# Patient Record
Sex: FEMALE | Race: WHITE | NOT HISPANIC OR LATINO | Employment: OTHER | URBAN - NONMETROPOLITAN AREA
[De-identification: names, ages, dates, MRNs, and addresses within clinical notes are randomized per-mention and may not be internally consistent; named-entity substitution may affect disease eponyms.]

---

## 2017-10-09 ENCOUNTER — AMBULATORY - GICH (OUTPATIENT)
Dept: FAMILY MEDICINE | Facility: OTHER | Age: 72
End: 2017-10-09

## 2017-10-09 DIAGNOSIS — Z23 ENCOUNTER FOR IMMUNIZATION: ICD-10-CM

## 2017-11-22 ENCOUNTER — OFFICE VISIT - GICH (OUTPATIENT)
Dept: INTERNAL MEDICINE | Facility: OTHER | Age: 72
End: 2017-11-22

## 2017-11-22 ENCOUNTER — HISTORY (OUTPATIENT)
Dept: INTERNAL MEDICINE | Facility: OTHER | Age: 72
End: 2017-11-22

## 2017-11-22 DIAGNOSIS — R03.0 ELEVATED BLOOD PRESSURE READING WITHOUT DIAGNOSIS OF HYPERTENSION: ICD-10-CM

## 2017-11-22 DIAGNOSIS — Z79.899 OTHER LONG TERM (CURRENT) DRUG THERAPY: ICD-10-CM

## 2017-11-22 DIAGNOSIS — E78.00 PURE HYPERCHOLESTEROLEMIA: ICD-10-CM

## 2017-11-22 DIAGNOSIS — R73.03 PREDIABETES: ICD-10-CM

## 2017-11-22 LAB
A/G RATIO - HISTORICAL: 2 (ref 1–2)
ALBUMIN SERPL-MCNC: 4.7 G/DL (ref 3.5–5.7)
ALP SERPL-CCNC: 73 IU/L (ref 34–104)
ALT (SGPT) - HISTORICAL: 14 IU/L (ref 7–52)
ANION GAP - HISTORICAL: 12 (ref 5–18)
AST SERPL-CCNC: 22 IU/L (ref 13–39)
BILIRUB SERPL-MCNC: 0.4 MG/DL (ref 0.3–1)
BUN SERPL-MCNC: 17 MG/DL (ref 7–25)
BUN/CREAT RATIO - HISTORICAL: 23
CALCIUM SERPL-MCNC: 9.6 MG/DL (ref 8.6–10.3)
CHLORIDE SERPLBLD-SCNC: 105 MMOL/L (ref 98–107)
CHOL/HDL RATIO - HISTORICAL: 3.19
CHOLESTEROL TOTAL: 172 MG/DL
CO2 SERPL-SCNC: 24 MMOL/L (ref 21–31)
CREAT SERPL-MCNC: 0.75 MG/DL (ref 0.7–1.3)
ERYTHROCYTE [DISTWIDTH] IN BLOOD BY AUTOMATED COUNT: 13.4 % (ref 11.5–15.5)
ESTIMATED AVERAGE GLUCOSE: 97 MG/DL
GFR IF NOT AFRICAN AMERICAN - HISTORICAL: >60 ML/MIN/1.73M2
GLOBULIN - HISTORICAL: 2.4 G/DL (ref 2–3.7)
GLUCOSE SERPL-MCNC: 105 MG/DL (ref 70–105)
HCT VFR BLD AUTO: 43.2 % (ref 33–51)
HDLC SERPL-MCNC: 54 MG/DL (ref 23–92)
HEMOGLOBIN A1C MONITORING (POCT) - HISTORICAL: 5 % (ref 4–6.2)
HEMOGLOBIN: 14.5 G/DL (ref 12–16)
LDLC SERPL CALC-MCNC: 95 MG/DL
MCH RBC QN AUTO: 30.2 PG (ref 26–34)
MCHC RBC AUTO-ENTMCNC: 33.6 G/DL (ref 32–36)
MCV RBC AUTO: 90 FL (ref 80–100)
NON-HDL CHOLESTEROL - HISTORICAL: 118 MG/DL
PLATELET # BLD AUTO: 226 THOU/CU MM (ref 140–440)
PMV BLD: 10.5 FL (ref 6.5–11)
POTASSIUM SERPL-SCNC: 4.3 MMOL/L (ref 3.5–5.1)
PROT SERPL-MCNC: 7.1 G/DL (ref 6.4–8.9)
PROVIDER ORDERDED STATUS - HISTORICAL: NORMAL
RED BLOOD COUNT - HISTORICAL: 4.8 MIL/CU MM (ref 4–5.2)
SODIUM SERPL-SCNC: 141 MMOL/L (ref 133–143)
TRIGL SERPL-MCNC: 116 MG/DL
WHITE BLOOD COUNT - HISTORICAL: 5.9 THOU/CU MM (ref 4.5–11)

## 2017-11-22 ASSESSMENT — PATIENT HEALTH QUESTIONNAIRE - PHQ9: SUM OF ALL RESPONSES TO PHQ QUESTIONS 1-9: 0

## 2017-11-29 ENCOUNTER — HOSPITAL ENCOUNTER (OUTPATIENT)
Dept: RADIOLOGY | Facility: OTHER | Age: 72
End: 2017-11-29
Attending: INTERNAL MEDICINE

## 2017-11-29 DIAGNOSIS — Z12.31 ENCOUNTER FOR SCREENING MAMMOGRAM FOR MALIGNANT NEOPLASM OF BREAST: ICD-10-CM

## 2017-12-27 NOTE — PROGRESS NOTES
Patient Information     Patient Name MRN Diane Ball [id number] Female 1945      Progress Notes by Savita Tariq DO at 2017  9:40 AM     Author:  Savita Tariq DO Service:  (none) Author Type:  PHYS- Osteopathic     Filed:  2017  7:08 AM Encounter Date:  2017 Status:  Signed     :  Savita Tariq DO (PHYS- Osteopathic)            Chief Complaint     Patient presents with       Medication Management      review, discussion and refills       HPI: Ms. Ricardo is a 72 y.o. female who presents today to [facility] and for annual visit.  She overall is feeling good.      She overall is doing well.  She does have a history of hyperlipidemia and prediabetes.  She has been watching her sugar intake overall.  The last couple years her sugars have been normal.  She does have a history of hyperlipidemia and is on lovastatin.  She denies any side effects from the medication.  She does need a refill today.  She is on a baby aspirin which she takes daily.  She denies any GI upset from this.  A history of elevated blood pressure while in the clinic.  She does take it at home and it is typically in the 1:30 range systolic.    She is post menopausal.  She is up-to-date on her DEXA scan which was normal in .  She does have a mammogram scheduled in the next week.  She has never had colonoscopy and it has been a while since having any colon cancer screening.  She did have her vaccines done and she is up-to-date.    History is discussed on 2017 with patient and reviewed in previous available records by myself.  It is current to the best of my knowledge as below.    Past Medical History:     Diagnosis  Date     Elevated blood pressure reading without diagnosis of hypertension     Elevated in clinic, monitors at home with normal values.        Hyperlipidemia 2011     Prediabetes 2015        Past Surgical History:      Procedure  Laterality Date     LAP CHOLECYSTECTOMY       Laparoscopic cholecystectomy        TONSIL AND ADENOIDECTOMY      as a child           Current Outpatient Prescriptions     Medication  Sig     aspirin 81 mg tablet Take 81 mg by mouth once daily with a meal.     CALCIUM CARBONATE/VITAMIN D2 (CALCIUM 600 + D ORAL) Take  by mouth 2 times daily.     lovastatin (MEVACOR) 20 mg tablet Take 1 tablet by mouth at bedtime.     MULTIVITS-MIN/FA/LUT/ZEAXANTH (ICAPS MV ORAL) Take  by mouth. 4 caps daily      No current facility-administered medications for this visit.      Medications have been reviewed by me and are current to the best of my knowledge and ability.       No Known Allergies     Family History       Problem   Relation Age of Onset     Cancer  Mother      Spinal Cancer       Cancer  Maternal Aunt      Hodgkin's lymphoma       Cancer-prostate  Other      Cancer  Other      thyroid cancer       Unknown  Father      Heart attack  Maternal Grandmother 74     Asthma  Maternal Grandfather      No Known Problems  Son      Cancer-breast  No Family History        Family Status     Relation  Status     Mother  at age 79    spinal cancer      Maternal Aunt      Other      Father      Maternal Grandmother      Maternal Grandfather      Son Alive     No Family History          Social History     Social History        Marital status:       Spouse name: N/A     Number of children:  N/A     Years of education:  N/A     Social History Main Topics          Smoking status:   Former Smoker      Packs/day:  0.40      Years:  22.00      Types:  Cigarettes      Quit date:  1986      Smokeless tobacco:   Never Used      Alcohol use   0.5 oz/week     1 Glasses of wine per week        Comment: occasionally       Drug use:   No      Sexual activity:   Yes      Partners:  Male      Birth control/ protection:  Post-menopausal      Other Topics  Concern     Not on file      Social History Narrative     The patient is , Jonny.  She is a  "retired .    One grown son, lives in [place], one grandchild.     and son are POA for health care if needed.                                  ROS  GEN: -fevers/-chills/-night sweats/-wt change  NEURO: -headaches/-vision changes  EARS: -hearing changes/-tinnitus  NOSE: -drainage/-congestion  MOUTH/THROAT: - sore throat/-dysphagia/-sores  LUNGS: -sob/-cough  CARDIOVASCULAR: -cp/-palpitations  GI: -pain/-diarrhea/-constipation/-bloody stools  : -dysuria/-hematuria  ENDOCRINE: -skin or hair changes/-hot-cold intolerance  HEMATOLOGIC/LYMPHATIC: -swollen nodes/-easy bruising  SKIN: -rashes/-lesions  MSK/RHEUM: + Occasional joint pain/-swelling  NEURO: -weakness/-parasthesias  PSYCH: -anhedonia/-depression/-anxiety  SLEEP: -daytime somnolence       EXAM:   /74  Pulse 72  Temp 97.7  F (36.5  C) (Tympanic)  Resp 20  Ht 1.537 m (5' 0.5\")  Wt 94.4 kg (208 lb 3 oz)  Breastfeeding? No  BMI 39.99 kg/m2  Estimated body mass index is 39.99 kg/(m^2) as calculated from the following:    Height as of this encounter: 1.537 m (5' 0.5\").    Weight as of this encounter: 94.4 kg (208 lb 3 oz).   GEN: Vitals reviewed.  Healthy appearing. Patient is in no acute distress. Cooperative with exam.  HEENT: Normocephalic atraumatic.  Normal external eye, conjunctiva, lids, cornea with no scleral icterus or conjunctival erythema. Pupils equally round. Oropharynx with no erythema or exudates. Dentition adequate.    NECK: Supple; no thyromegaly or masses noted.  No cervical or supraclavicular lymphadenopathy.  CV: Heart regular in rate and rhythm with no murmur.  Radial and posterior tibial pulses palpable.  LUNGS: Lungs clear to auscultation bilaterally.  Chest rise equal bilaterally.  No accessory muscle use.  ABD:  Obese, Soft, nondistended.  SKIN: Warm and dry to touch.  No rash on face, arms and legs.  EXT: No clubbing or cyanosis.  No peripheral edema.  NEURO: Alert and oriented to person, place, and " time.  Cranial nerves II-XII grossly intact with no focal or lateralizing deficits.  Muscle tone normal.  Gait normal. No tremor. Sensation intact to light touch.  MSK: ROM of upper and lower ext symmetric and full.  PSYCH: Mood is good. Affect appropriate. Speech fluent. Answers questions appropriately and thought process normal.       ASSESSMENT AND PLAN:    1. Pure hypercholesterolemia  - lipids checked and pending  - On statin with no clinical evidence or complaint of myalgias or other ADRs  - Discussed importance of exercise and diet.  - Ms. Ricardo's Body mass index is 39.99 kg/(m^2). This is out of the normal range for a 72 y.o. Normal range for ages 18+ is between 18.5 and 24.9. To lose weight we reviewed risks and benefits of appropriate options such as diet, exercise, and medications. Patient's strategy will be  self-directed nutrition plan and self-directed exercise program  - lovastatin (MEVACOR) 20 mg tablet; Take 1 tablet by mouth at bedtime.  Dispense: 90 tablet; Refill: 4  - COMP METABOLIC PANEL; Future    2. Prediabetes  - at this time we will recheck her A1c.  If it is normal again we will plan to move digits checking her blood sugar yearly.  She is to continue to work on her diet overall.  She is also encouraged to work on weight loss.  - HEMOGLOBIN A1C MONITORING (POCT); Future    3. High risk medication use  - COMP METABOLIC PANEL; Future  - CBC W PLT NO DIFF; Future    4. Elevated blood pressure reading without diagnosis of hypertension  - at this time we will continue to monitor her blood pressure.  She was given instructions for home.  She is to call if this does remain elevated.        Return in about 1 year (around 11/22/2018) for Annual Exam.       Patient Instructions   Please check your blood pressure daily at various times, record this and bring it to your follow up appointment.  Your blood pressure goal is greater than 110/50 and less than 130/80.  Please call if it is consistently  "outside this range.       Index German All languages Related topics   High Blood Pressure: Essential Hypertension   ________________________________________________________________________  KEY POINTS    High blood pressure means that your blood pressure is higher than normal. It is called essential hypertension when no cause for it can be found.    Weight loss, changes in your diet, and exercise may be the only treatment you need. If lifestyle changes don t lower your blood pressure enough, your healthcare provider may prescribe medicine. Many people need to take 2 or more medicines to bring their blood pressure down to a healthy level.    Talk to your healthcare provider about your personal and family medical history and your lifestyle habits. This will help you know what you can do to lower your risk for high blood pressure.  ________________________________________________________________________  What is high blood pressure?  Blood pressure is the force of blood against artery walls as the heart pumps blood through the body. You may be told that you have high blood pressure (hypertension) if your blood pressure is higher than normal. Hypertension is called essential when no cause for it can be found. When the cause of hypertension is known, such as from kidney disease or a tumor, it is called secondary hypertension.  Blood pressure can rise and fall with exercise, rest, or emotions.    Normal resting blood pressure ranges up to 120/80 (\"120 over 80\"). The first number (120 in this example) is the pressure when the heart beats and pushes blood out to the rest of the body. The second number (80 in this example) is the pressure when the heart rests between beats.    Blood pressure is borderline high if it is 120/80 or higher but less than 140/90.    High blood pressure is 140/90 or higher for most people. If you have chronic kidney disease, 130/80 or higher is considered high blood pressure.    Why is high blood " pressure a problem?  High blood pressure is a problem in many ways.    Your heart has to work harder to pump blood through your body. The added workload on the heart causes thickening of the heart muscle. Over time, the thickening damages the heart muscle so that it can no longer pump normally. This can lead to a disease called heart failure.    The higher pressure in your arteries may cause them to weaken and bleed, resulting in a stroke.    As you get older, blood vessels may become hardened. High blood pressure speeds up this process. Hardened or narrowed arteries may not be able to supply enough blood to all parts of your body.    High blood pressure may lead to atherosclerosis, which is when deposits of cholesterol, fatty substances, and blood cells clog up an artery. Atherosclerosis is the leading cause of heart attacks. It can also cause strokes.    Your kidneys, brain, and eyes may be damaged.  You may need treatment for high blood pressure for the rest of your life. However, proper treatment can control your blood pressure and help prevent heart disease, heart attack, or stroke. It can also help prevent long-term health problems, such as heart failure, kidney failure, blindness, and dementia.  If you already have some complications, such as breathing problems or chest pain, lowering your blood pressure may make these problems less severe.    What is the cause?  There are no clear causes of essential hypertension. However, many things can increase blood pressure, such as:    Being overweight    Smoking    Eating a diet high in salt    Drinking a lot of alcohol  Other important factors include:    Race.  Americans are more likely to have high blood pressure.    Gender. Males have a greater chance of developing high blood pressure than women until age 55. After the age of 75, women are more likely to develop high blood pressure than men.    Heredity. If you have parents with high blood pressure, you are  more at risk.    Age. The older you get, the more likely you are to have high blood pressure.  Also, some medicines increase blood pressure.  Stress and drinking caffeine can make blood pressure go up temporarily, but it s not clear that they have any long-term effects on blood pressure.    What are the symptoms?  You may have high blood pressure for a long time without symptoms. You may not be able to tell by the way you feel that your blood pressure is high. The only way to find out if your blood pressure is high is to have it measured. That's why it s important to have your blood pressure checked at least once a year.  When high blood pressure does cause symptoms, they may include:    Headaches    Nosebleeds    Getting tired easily    Blurred vision    Dizziness    Lightheadedness    Feeling like your heart is racing or fluttering    Shortness of breath    Chest pain    Memory problems    Daytime sleepiness    How is it diagnosed?  Blood pressure is checked at most healthcare visits. High blood pressure is usually discovered during one of these visits. If your blood pressure is high, you will be asked to return for follow-up checks. Your healthcare provider will ask about your personal and family medical history and examine you.  Tests to look for a possible cause of high blood pressure may include:    Urine and blood tests    Chest X-ray    Electrocardiogram (ECG), which measures and records your heartbeat  You may be asked to use a portable blood-pressure measuring device, which will take your pressure at different times during day and night.    How is it treated?  If your blood pressure is borderline high, you may be able to bring it down to a normal level without medicine. Weight loss, changes in your diet, and exercise may be the only treatment you need.  If lifestyle changes don t lower your blood pressure enough, your healthcare provider may prescribe medicine. Many people need to take 2 or more medicines  to bring their blood pressure down to a healthy level. It may take several weeks or months to find the best treatment for you.    How can I take care of myself?  If you have high blood pressure, there are things you can do now to take care of yourself and to prevent problems in the future:    Follow your treatment plan and know how to take your medicines.    Work with your healthcare provider to find what lifestyle changes and medicines are right for you.    Follow the directions that come with your medicine, including information about food or alcohol. Make sure you know how and when to take your medicine. Do not take more or less than you are supposed to take.    Many medicines have side effects. A side effect is a symptom or problem that is caused by the medicine. Ask your healthcare provider or pharmacist what side effects your medicine may cause and what you should do if you have side effects. Ask if you should avoid some nonprescription medicines.    Be careful with nonprescription medicines or herbal supplements. Some can raise blood pressure. This includes diet pills, cold and pain medicines, and energy boosters. Read labels or ask your pharmacist if the medicine or supplement affects blood pressure. Some illegal drugs, like cocaine, can also affect blood pressure.    Check your blood pressure (or have it checked) as often as your provider advises. Keep a diary of the readings. A diary is also a good place to note your exercise, weight, salt intake, types of food you are eating, and your feelings. This can help you learn how these things can affect your blood pressure. Take your diary with you when you visit your provider. It may help you and your provider manage your blood pressure and adjust your medicines if needed.    Don t smoke.    Eat a healthy diet that is low in salt, saturated fat, trans fat, and cholesterol. Include lots of fruits, vegetables, and fat-free or low-fat milk and milk products.    Get  regular exercise, according to your healthcare provider's advice. For example, you might walk, bike, or swim at least 30 minutes 3 to 5 times a week.    Limit the amount of alcohol you drink. Moderate drinking is up to 1 drink a day for women and up to 2 drinks for men.    Lose weight if you need to.    Try to reduce the stress in your life or learn how to deal better with situations that make you feel anxious.    Ask your healthcare provider:    How and when you will get your test results    How long it will take to recover    If there are activities you should avoid and when you can return to your normal activities    How to take care of yourself at home    What symptoms or problems you should watch for and what to do if you have them    Make sure you know when you should come back for a checkup. Keep all appointments for provider visits or tests.    How can I help prevent high blood pressure?  You can help prevent this disease with a heart-healthy lifestyle:    Eat a healthy diet and keep a healthy weight.    Stay fit with the right kind of exercise for you.    Decrease stress.    Don t smoke.    Limit your use of alcohol.  Talk to your healthcare provider about your personal and family medical history and your lifestyle habits. This will help you know what you can do to lower your risk for high blood pressure.    Developed by [employer].  Adult Advisor 2016.3 published by [employer].  Last modified: 2016-04-18  Last reviewed: 2016-04-15  This content is reviewed periodically and is subject to change as new health information becomes available. The information is intended to inform and educate and is not a replacement for medical evaluation, advice, diagnosis or treatment by a healthcare professional.  References   Adult Advisor 2016.3 Index    Copyright   2016 [employer], a division of [employer] All rights reserved.               MOLLY OTERO DO   11/22/2017 10:30 AM    This document was  prepared using voice generated softwear. While every attempt was made for accuracy, grammatical errors may exist.

## 2017-12-28 NOTE — PATIENT INSTRUCTIONS
Patient Information     Patient Name MRN Diane Ball [id number] Female 1945      Patient Instructions by Savita Tariq DO at 2017  9:40 AM     Author:  Savita Tariq DO Service:  (none) Author Type:  PHYS- Osteopathic     Filed:  2017 10:26 AM Encounter Date:  2017 Status:  Signed     :  Savita Tariq DO (PHYS- Osteopathic)            Please check your blood pressure daily at various times, record this and bring it to your follow up appointment.  Your blood pressure goal is greater than 110/50 and less than 130/80.  Please call if it is consistently outside this range.       Index Setswana All languages Related topics   High Blood Pressure: Essential Hypertension   ________________________________________________________________________  KEY POINTS    High blood pressure means that your blood pressure is higher than normal. It is called essential hypertension when no cause for it can be found.    Weight loss, changes in your diet, and exercise may be the only treatment you need. If lifestyle changes don t lower your blood pressure enough, your healthcare provider may prescribe medicine. Many people need to take 2 or more medicines to bring their blood pressure down to a healthy level.    Talk to your healthcare provider about your personal and family medical history and your lifestyle habits. This will help you know what you can do to lower your risk for high blood pressure.  ________________________________________________________________________  What is high blood pressure?  Blood pressure is the force of blood against artery walls as the heart pumps blood through the body. You may be told that you have high blood pressure (hypertension) if your blood pressure is higher than normal. Hypertension is called essential when no cause for it can be found. When the cause of hypertension is known, such as from kidney disease or a tumor, it is called secondary  "hypertension.  Blood pressure can rise and fall with exercise, rest, or emotions.    Normal resting blood pressure ranges up to 120/80 (\"120 over 80\"). The first number (120 in this example) is the pressure when the heart beats and pushes blood out to the rest of the body. The second number (80 in this example) is the pressure when the heart rests between beats.    Blood pressure is borderline high if it is 120/80 or higher but less than 140/90.    High blood pressure is 140/90 or higher for most people. If you have chronic kidney disease, 130/80 or higher is considered high blood pressure.    Why is high blood pressure a problem?  High blood pressure is a problem in many ways.    Your heart has to work harder to pump blood through your body. The added workload on the heart causes thickening of the heart muscle. Over time, the thickening damages the heart muscle so that it can no longer pump normally. This can lead to a disease called heart failure.    The higher pressure in your arteries may cause them to weaken and bleed, resulting in a stroke.    As you get older, blood vessels may become hardened. High blood pressure speeds up this process. Hardened or narrowed arteries may not be able to supply enough blood to all parts of your body.    High blood pressure may lead to atherosclerosis, which is when deposits of cholesterol, fatty substances, and blood cells clog up an artery. Atherosclerosis is the leading cause of heart attacks. It can also cause strokes.    Your kidneys, brain, and eyes may be damaged.  You may need treatment for high blood pressure for the rest of your life. However, proper treatment can control your blood pressure and help prevent heart disease, heart attack, or stroke. It can also help prevent long-term health problems, such as heart failure, kidney failure, blindness, and dementia.  If you already have some complications, such as breathing problems or chest pain, lowering your blood " pressure may make these problems less severe.    What is the cause?  There are no clear causes of essential hypertension. However, many things can increase blood pressure, such as:    Being overweight    Smoking    Eating a diet high in salt    Drinking a lot of alcohol  Other important factors include:    Race.  Americans are more likely to have high blood pressure.    Gender. Males have a greater chance of developing high blood pressure than women until age 55. After the age of 75, women are more likely to develop high blood pressure than men.    Heredity. If you have parents with high blood pressure, you are more at risk.    Age. The older you get, the more likely you are to have high blood pressure.  Also, some medicines increase blood pressure.  Stress and drinking caffeine can make blood pressure go up temporarily, but it s not clear that they have any long-term effects on blood pressure.    What are the symptoms?  You may have high blood pressure for a long time without symptoms. You may not be able to tell by the way you feel that your blood pressure is high. The only way to find out if your blood pressure is high is to have it measured. That's why it s important to have your blood pressure checked at least once a year.  When high blood pressure does cause symptoms, they may include:    Headaches    Nosebleeds    Getting tired easily    Blurred vision    Dizziness    Lightheadedness    Feeling like your heart is racing or fluttering    Shortness of breath    Chest pain    Memory problems    Daytime sleepiness    How is it diagnosed?  Blood pressure is checked at most healthcare visits. High blood pressure is usually discovered during one of these visits. If your blood pressure is high, you will be asked to return for follow-up checks. Your healthcare provider will ask about your personal and family medical history and examine you.  Tests to look for a possible cause of high blood pressure may  include:    Urine and blood tests    Chest X-ray    Electrocardiogram (ECG), which measures and records your heartbeat  You may be asked to use a portable blood-pressure measuring device, which will take your pressure at different times during day and night.    How is it treated?  If your blood pressure is borderline high, you may be able to bring it down to a normal level without medicine. Weight loss, changes in your diet, and exercise may be the only treatment you need.  If lifestyle changes don t lower your blood pressure enough, your healthcare provider may prescribe medicine. Many people need to take 2 or more medicines to bring their blood pressure down to a healthy level. It may take several weeks or months to find the best treatment for you.    How can I take care of myself?  If you have high blood pressure, there are things you can do now to take care of yourself and to prevent problems in the future:    Follow your treatment plan and know how to take your medicines.    Work with your healthcare provider to find what lifestyle changes and medicines are right for you.    Follow the directions that come with your medicine, including information about food or alcohol. Make sure you know how and when to take your medicine. Do not take more or less than you are supposed to take.    Many medicines have side effects. A side effect is a symptom or problem that is caused by the medicine. Ask your healthcare provider or pharmacist what side effects your medicine may cause and what you should do if you have side effects. Ask if you should avoid some nonprescription medicines.    Be careful with nonprescription medicines or herbal supplements. Some can raise blood pressure. This includes diet pills, cold and pain medicines, and energy boosters. Read labels or ask your pharmacist if the medicine or supplement affects blood pressure. Some illegal drugs, like cocaine, can also affect blood pressure.    Check your blood  pressure (or have it checked) as often as your provider advises. Keep a diary of the readings. A diary is also a good place to note your exercise, weight, salt intake, types of food you are eating, and your feelings. This can help you learn how these things can affect your blood pressure. Take your diary with you when you visit your provider. It may help you and your provider manage your blood pressure and adjust your medicines if needed.    Don t smoke.    Eat a healthy diet that is low in salt, saturated fat, trans fat, and cholesterol. Include lots of fruits, vegetables, and fat-free or low-fat milk and milk products.    Get regular exercise, according to your healthcare provider's advice. For example, you might walk, bike, or swim at least 30 minutes 3 to 5 times a week.    Limit the amount of alcohol you drink. Moderate drinking is up to 1 drink a day for women and up to 2 drinks for men.    Lose weight if you need to.    Try to reduce the stress in your life or learn how to deal better with situations that make you feel anxious.    Ask your healthcare provider:    How and when you will get your test results    How long it will take to recover    If there are activities you should avoid and when you can return to your normal activities    How to take care of yourself at home    What symptoms or problems you should watch for and what to do if you have them    Make sure you know when you should come back for a checkup. Keep all appointments for provider visits or tests.    How can I help prevent high blood pressure?  You can help prevent this disease with a heart-healthy lifestyle:    Eat a healthy diet and keep a healthy weight.    Stay fit with the right kind of exercise for you.    Decrease stress.    Don t smoke.    Limit your use of alcohol.  Talk to your healthcare provider about your personal and family medical history and your lifestyle habits. This will help you know what you can do to lower your risk  for high blood pressure.    Developed by [employer].  Adult Advisor 2016.3 published by [employer].  Last modified: 2016-04-18  Last reviewed: 2016-04-15  This content is reviewed periodically and is subject to change as new health information becomes available. The information is intended to inform and educate and is not a replacement for medical evaluation, advice, diagnosis or treatment by a healthcare professional.  References   Adult Advisor 2016.3 Index    Copyright   2016 [employer], a division of [employer] All rights reserved.

## 2017-12-30 NOTE — NURSING NOTE
Patient Information     Patient Name MRN Diane Ball [id number] Female 1945      Nursing Note by Ana Waldrop at 2017  9:40 AM     Author:  Ana Waldrop Service:  (none) Author Type:  (none)     Filed:  2017  7:08 AM Encounter Date:  2017 Status:  Signed     :  Ana Waldrop            Patient presents to clinic for medication management, review and refills.    Ana Waldrop LPN..................2017  9:53 AM

## 2018-01-27 VITALS
TEMPERATURE: 97.7 F | BODY MASS INDEX: 39.3 KG/M2 | DIASTOLIC BLOOD PRESSURE: 76 MMHG | SYSTOLIC BLOOD PRESSURE: 170 MMHG | HEART RATE: 72 BPM | HEIGHT: 61 IN | WEIGHT: 208.19 LBS | RESPIRATION RATE: 20 BRPM

## 2018-02-02 ASSESSMENT — PATIENT HEALTH QUESTIONNAIRE - PHQ9: SUM OF ALL RESPONSES TO PHQ QUESTIONS 1-9: 0

## 2018-02-26 ENCOUNTER — DOCUMENTATION ONLY (OUTPATIENT)
Dept: FAMILY MEDICINE | Facility: OTHER | Age: 73
End: 2018-02-26

## 2018-02-26 RX ORDER — LOVASTATIN 20 MG
20 TABLET ORAL AT BEDTIME
COMMUNITY
Start: 2017-11-22 | End: 2018-11-21

## 2018-10-05 ENCOUNTER — ALLIED HEALTH/NURSE VISIT (OUTPATIENT)
Dept: FAMILY MEDICINE | Facility: OTHER | Age: 73
End: 2018-10-05
Attending: INTERNAL MEDICINE
Payer: COMMERCIAL

## 2018-10-05 DIAGNOSIS — Z23 NEED FOR PROPHYLACTIC VACCINATION AND INOCULATION AGAINST INFLUENZA: Primary | ICD-10-CM

## 2018-10-05 PROCEDURE — G0008 ADMIN INFLUENZA VIRUS VAC: HCPCS

## 2018-10-05 PROCEDURE — 90662 IIV NO PRSV INCREASED AG IM: CPT

## 2018-10-05 NOTE — PROGRESS NOTES
Injectable Influenza Immunization Documentation    1.  Is the person to be vaccinated sick today?   No    2. Does the person to be vaccinated have an allergy to a component   of the vaccine?   No  Egg Allergy Algorithm Link    3. Has the person to be vaccinated ever had a serious reaction   to influenza vaccine in the past?   No    4. Has the person to be vaccinated ever had Guillain-Barré syndrome?   No    Form completed by Cari Woodruff LPN 10/5/2018   10:11 AM    Prior to injection verified patient identity using patient's name and date of birth.  Due to injection administration, patient instructed to remain in clinic for 15 minutes  afterwards, and to report any adverse reaction to me immediately.

## 2018-10-05 NOTE — MR AVS SNAPSHOT
After Visit Summary   10/5/2018    Diane Ricardo    MRN: [id number]           Patient Information     Date Of Birth          1945        Visit Information        Provider Department      10/5/2018 9:45 AM  [facility]        Today's Diagnoses     Need for prophylactic vaccination and inoculation against influenza    -  1       Follow-ups after your visit        Your next 10 appointments already scheduled     Nov 21, 2018 10:00 AM CST   PHYSICAL with Saima Engle CNP   [facility] ([facility]    [address]  [place] [address]   [phone]            Nov 30, 2018  9:15 AM CST   (Arrive by 9:00 AM)   MA SCREENING BILATERAL W/ LEEANN with [facility]    [address]  [place] [address]   [phone]           How do I prepare for my exam? (Food and drink instructions) No Food and Drink Restrictions.  How do I prepare for my exam? (Other instructions) Do not use any powder, lotion or deodorant under your arms or on your breast. If you do, we will ask you to remove it before your exam.  What should I wear: Wear comfortable, two-piece clothing.  How long does the exam take: Most scans will take 15 minutes.  What should I bring: Bring any previous mammograms from other facilities or have them mailed to the breast center.  Do I need a :  No  is needed.  What do I need to tell my doctor: If you have any allergies, tell your care team.  What should I do after the exam: No restrictions, You may resume normal activities.  What is this test: This test is an x-ray of the breast to look for breast disease. The breast is pressed between two plates to flatten and spread the tissue. An X-ray is taken of the breast from different angles.  Who should I call with questions: If you have any questions, please call the Imaging Department  "where you will have your exam. Directions, parking instructions, and other information is available on our website, [email or web address].  Other information about my exam Three-dimensional (3D) mammograms are available at [facility] locations in [facility], [place], and [place].  Health locations include [place] and the [facility] in [place].  Benefits of 3D mammograms include: * Improved rate of cancer detection * Decreases your chance of having to go back for more tests, which means fewer: * \"False-positive\" results (This means that there is an abnormal area but it isn't cancer.) * Invasive testing procedures, such as a biopsy or surgery * Can provide clearer images of the breast if you have dense breast tissue.  *3D mammography is an optional exam that anyone can have with a 2D mammogram. It doesn't replace or take the place of a 2D mammogram. 2D mammograms remain an effective screening test for all women.  Not all insurance companies cover the cost of a 3D mammogram. Check with your insurance.              Who to contact     If you have questions or need follow up information about today's clinic visit or your schedule please contact [facility] directly at [phone].  Normal or non-critical lab and imaging results will be communicated to you by MyChart, letter or phone within 4 business days after the clinic has received the results. If you do not hear from us within 7 days, please contact the clinic through [employer] or phone. If you have a critical or abnormal lab result, we will notify you by phone as soon as possible.  Submit refill requests through [employer] or call your pharmacy and they will forward the refill request to us. Please allow 3 business days for your refill to be completed.          Additional Information About Your Visit        Care EveryWhere ID     This is your Care EveryWhere ID. This could be used by " other organizations to access your [facility] medical records  [id number]         Blood Pressure from Last 3 Encounters:   11/22/17 170/76   11/22/16 126/80   03/09/16 130/80    Weight from Last 3 Encounters:   11/22/17 208 lb 3 oz (94.4 kg)   11/22/16 211 lb (95.7 kg)   03/09/16 207 lb (93.9 kg)              We Performed the Following     HC FLU VACCINE, INCREASED ANTIGEN, PRESV FREE        Primary Care Provider Office Phone # Fax #    Savita Tariq [phone] [address]  [facility] [address]        Equal Access to Services     [facility] Hospital[facility] : Hadii aad octaviano hadasho Sotyeali, waaxda luqadaha, qaybta kaalmada adeegyada, victoriano schwab . So [facility] [phone].    ATENCIÓN: Si habla español, tiene a latif disposición servicios gratuitos de asistencia lingüística. Llame al [phone].    We comply with applicable federal civil rights laws and [place] laws. We do not discriminate on the basis of race, color, national origin, age, disability, sex, sexual orientation, or gender identity.            Thank you!     Thank you for choosing [facility]  for your care. Our goal is always to provide you with excellent care. Hearing back from our patients is one way we can continue to improve our services. Please take a few minutes to complete the written survey that you may receive in the mail after your visit with us. Thank you!             Your Updated Medication List - Protect others around you: Learn how to safely use, store and throw away your medicines at www.disposemymeds.org.          This list is accurate as of 10/5/18 10:15 AM.  Always use your most recent med list.                   Brand Name Dispense Instructions for use Diagnosis    aspirin 81 MG tablet      Take 81 mg by mouth daily with food        CALCIUM 600 + D PO      Take by mouth 2 times daily        ICAPS MV PO      Take 4 capsules by mouth daily        lovastatin 20 MG tablet     MEVACOR     Take 20 mg by mouth At Bedtime

## 2018-11-09 ENCOUNTER — NURSE TRIAGE (OUTPATIENT)
Dept: INTERNAL MEDICINE | Facility: OTHER | Age: 73
End: 2018-11-09

## 2018-11-09 ENCOUNTER — APPOINTMENT (OUTPATIENT)
Dept: CT IMAGING | Facility: OTHER | Age: 73
End: 2018-11-09
Attending: STUDENT IN AN ORGANIZED HEALTH CARE EDUCATION/TRAINING PROGRAM
Payer: COMMERCIAL

## 2018-11-09 ENCOUNTER — HOSPITAL ENCOUNTER (OUTPATIENT)
Facility: OTHER | Age: 73
Setting detail: OBSERVATION
Discharge: HOME | End: 2018-11-10
Attending: STUDENT IN AN ORGANIZED HEALTH CARE EDUCATION/TRAINING PROGRAM | Admitting: INTERNAL MEDICINE
Payer: COMMERCIAL

## 2018-11-09 DIAGNOSIS — N13.30 HYDRONEPHROSIS, LEFT: Primary | ICD-10-CM

## 2018-11-09 DIAGNOSIS — N20.1 LEFT URETERAL STONE: ICD-10-CM

## 2018-11-09 LAB
ALBUMIN SERPL-MCNC: 4 G/DL (ref 3.5–5.7)
ALBUMIN UR-MCNC: NEGATIVE MG/DL
ALP SERPL-CCNC: 63 U/L (ref 34–104)
ALT SERPL W P-5'-P-CCNC: 12 U/L (ref 7–52)
ANION GAP SERPL CALCULATED.3IONS-SCNC: 11 MMOL/L (ref 3–14)
APPEARANCE UR: CLEAR
AST SERPL W P-5'-P-CCNC: 17 U/L (ref 13–39)
BACTERIA #/AREA URNS HPF: ABNORMAL /HPF
BASOPHILS # BLD AUTO: 0 10E9/L (ref 0–0.2)
BASOPHILS NFR BLD AUTO: 0.2 %
BILIRUB SERPL-MCNC: 0.5 MG/DL (ref 0.3–1)
BILIRUB UR QL STRIP: NEGATIVE
BUN SERPL-MCNC: 15 MG/DL (ref 7–25)
CALCIUM SERPL-MCNC: 9.4 MG/DL (ref 8.6–10.3)
CHLORIDE SERPL-SCNC: 109 MMOL/L (ref 98–107)
CO2 SERPL-SCNC: 22 MMOL/L (ref 21–31)
COLOR UR AUTO: YELLOW
CREAT SERPL-MCNC: 0.9 MG/DL (ref 0.6–1.2)
DIFFERENTIAL METHOD BLD: NORMAL
EOSINOPHIL # BLD AUTO: 0 10E9/L (ref 0–0.7)
EOSINOPHIL NFR BLD AUTO: 0.1 %
ERYTHROCYTE [DISTWIDTH] IN BLOOD BY AUTOMATED COUNT: 12.8 % (ref 10–15)
GFR SERPL CREATININE-BSD FRML MDRD: 61 ML/MIN/1.7M2
GLUCOSE SERPL-MCNC: 126 MG/DL (ref 70–105)
GLUCOSE UR STRIP-MCNC: NEGATIVE MG/DL
HCT VFR BLD AUTO: 40.3 % (ref 35–47)
HGB BLD-MCNC: 13.6 G/DL (ref 11.7–15.7)
HGB UR QL STRIP: ABNORMAL
IMM GRANULOCYTES # BLD: 0 10E9/L (ref 0–0.4)
IMM GRANULOCYTES NFR BLD: 0.5 %
KETONES UR STRIP-MCNC: NEGATIVE MG/DL
LACTATE SERPL-SCNC: 1 MMOL/L (ref 0.5–2.2)
LEUKOCYTE ESTERASE UR QL STRIP: ABNORMAL
LYMPHOCYTES # BLD AUTO: 1 10E9/L (ref 0.8–5.3)
LYMPHOCYTES NFR BLD AUTO: 11.6 %
MCH RBC QN AUTO: 29.5 PG (ref 26.5–33)
MCHC RBC AUTO-ENTMCNC: 33.7 G/DL (ref 31.5–36.5)
MCV RBC AUTO: 87 FL (ref 78–100)
MONOCYTES # BLD AUTO: 0.4 10E9/L (ref 0–1.3)
MONOCYTES NFR BLD AUTO: 5.1 %
MUCOUS THREADS #/AREA URNS LPF: PRESENT /LPF
NEUTROPHILS # BLD AUTO: 6.7 10E9/L (ref 1.6–8.3)
NEUTROPHILS NFR BLD AUTO: 82.5 %
NITRATE UR QL: NEGATIVE
NON-SQ EPI CELLS #/AREA URNS LPF: ABNORMAL /LPF
PH UR STRIP: 5.5 PH (ref 5–9)
PLATELET # BLD AUTO: 198 10E9/L (ref 150–450)
POTASSIUM SERPL-SCNC: 4.1 MMOL/L (ref 3.5–5.1)
PROT SERPL-MCNC: 6.9 G/DL (ref 6.4–8.9)
RBC # BLD AUTO: 4.61 10E12/L (ref 3.8–5.2)
RBC #/AREA URNS AUTO: ABNORMAL /HPF
SODIUM SERPL-SCNC: 142 MMOL/L (ref 134–144)
SOURCE: ABNORMAL
SP GR UR STRIP: 1.01 (ref 1–1.03)
UROBILINOGEN UR STRIP-ACNC: 0.2 EU/DL (ref 0.2–1)
WBC # BLD AUTO: 8.2 10E9/L (ref 4–11)
WBC #/AREA URNS AUTO: ABNORMAL /HPF

## 2018-11-09 PROCEDURE — 80053 COMPREHEN METABOLIC PANEL: CPT | Performed by: STUDENT IN AN ORGANIZED HEALTH CARE EDUCATION/TRAINING PROGRAM

## 2018-11-09 PROCEDURE — 99220 ZZC INITIAL OBSERVATION CARE,LEVL III: CPT | Performed by: INTERNAL MEDICINE

## 2018-11-09 PROCEDURE — G0378 HOSPITAL OBSERVATION PER HR: HCPCS

## 2018-11-09 PROCEDURE — 36416 COLLJ CAPILLARY BLOOD SPEC: CPT | Performed by: STUDENT IN AN ORGANIZED HEALTH CARE EDUCATION/TRAINING PROGRAM

## 2018-11-09 PROCEDURE — 96374 THER/PROPH/DIAG INJ IV PUSH: CPT | Performed by: STUDENT IN AN ORGANIZED HEALTH CARE EDUCATION/TRAINING PROGRAM

## 2018-11-09 PROCEDURE — 25000128 H RX IP 250 OP 636: Performed by: INTERNAL MEDICINE

## 2018-11-09 PROCEDURE — 83605 ASSAY OF LACTIC ACID: CPT | Performed by: STUDENT IN AN ORGANIZED HEALTH CARE EDUCATION/TRAINING PROGRAM

## 2018-11-09 PROCEDURE — 25500064 ZZH RX 255 OP 636: Performed by: STUDENT IN AN ORGANIZED HEALTH CARE EDUCATION/TRAINING PROGRAM

## 2018-11-09 PROCEDURE — 85025 COMPLETE CBC W/AUTO DIFF WBC: CPT | Performed by: STUDENT IN AN ORGANIZED HEALTH CARE EDUCATION/TRAINING PROGRAM

## 2018-11-09 PROCEDURE — 99285 EMERGENCY DEPT VISIT HI MDM: CPT | Mod: 25 | Performed by: STUDENT IN AN ORGANIZED HEALTH CARE EDUCATION/TRAINING PROGRAM

## 2018-11-09 PROCEDURE — 74177 CT ABD & PELVIS W/CONTRAST: CPT

## 2018-11-09 PROCEDURE — 25000132 ZZH RX MED GY IP 250 OP 250 PS 637: Performed by: INTERNAL MEDICINE

## 2018-11-09 PROCEDURE — 25000128 H RX IP 250 OP 636: Performed by: STUDENT IN AN ORGANIZED HEALTH CARE EDUCATION/TRAINING PROGRAM

## 2018-11-09 PROCEDURE — 81001 URINALYSIS AUTO W/SCOPE: CPT | Performed by: STUDENT IN AN ORGANIZED HEALTH CARE EDUCATION/TRAINING PROGRAM

## 2018-11-09 PROCEDURE — 99285 EMERGENCY DEPT VISIT HI MDM: CPT | Mod: Z6 | Performed by: STUDENT IN AN ORGANIZED HEALTH CARE EDUCATION/TRAINING PROGRAM

## 2018-11-09 PROCEDURE — 96361 HYDRATE IV INFUSION ADD-ON: CPT | Performed by: STUDENT IN AN ORGANIZED HEALTH CARE EDUCATION/TRAINING PROGRAM

## 2018-11-09 RX ORDER — ACETAMINOPHEN 325 MG/1
650 TABLET ORAL EVERY 4 HOURS PRN
Status: DISCONTINUED | OUTPATIENT
Start: 2018-11-09 | End: 2018-11-10 | Stop reason: HOSPADM

## 2018-11-09 RX ORDER — OXYCODONE HYDROCHLORIDE 5 MG/1
5-10 TABLET ORAL
Status: DISCONTINUED | OUTPATIENT
Start: 2018-11-09 | End: 2018-11-10 | Stop reason: HOSPADM

## 2018-11-09 RX ORDER — ONDANSETRON 4 MG/1
4 TABLET, ORALLY DISINTEGRATING ORAL EVERY 6 HOURS PRN
Status: DISCONTINUED | OUTPATIENT
Start: 2018-11-09 | End: 2018-11-10 | Stop reason: HOSPADM

## 2018-11-09 RX ORDER — TAMSULOSIN HYDROCHLORIDE 0.4 MG/1
0.4 CAPSULE ORAL DAILY
Status: DISCONTINUED | OUTPATIENT
Start: 2018-11-09 | End: 2018-11-10 | Stop reason: HOSPADM

## 2018-11-09 RX ORDER — AMOXICILLIN 250 MG
2 CAPSULE ORAL 2 TIMES DAILY PRN
Status: DISCONTINUED | OUTPATIENT
Start: 2018-11-09 | End: 2018-11-10 | Stop reason: HOSPADM

## 2018-11-09 RX ORDER — ATORVASTATIN CALCIUM 10 MG/1
10 TABLET, FILM COATED ORAL AT BEDTIME
Status: DISCONTINUED | OUTPATIENT
Start: 2018-11-09 | End: 2018-11-10 | Stop reason: HOSPADM

## 2018-11-09 RX ORDER — SODIUM CHLORIDE 9 MG/ML
INJECTION, SOLUTION INTRAVENOUS CONTINUOUS
Status: DISCONTINUED | OUTPATIENT
Start: 2018-11-09 | End: 2018-11-09

## 2018-11-09 RX ORDER — PROCHLORPERAZINE MALEATE 5 MG
5 TABLET ORAL EVERY 6 HOURS PRN
Status: DISCONTINUED | OUTPATIENT
Start: 2018-11-09 | End: 2018-11-10 | Stop reason: HOSPADM

## 2018-11-09 RX ORDER — AMOXICILLIN 250 MG
1 CAPSULE ORAL 2 TIMES DAILY PRN
Status: DISCONTINUED | OUTPATIENT
Start: 2018-11-09 | End: 2018-11-10 | Stop reason: HOSPADM

## 2018-11-09 RX ORDER — NALOXONE HYDROCHLORIDE 0.4 MG/ML
.1-.4 INJECTION, SOLUTION INTRAMUSCULAR; INTRAVENOUS; SUBCUTANEOUS
Status: DISCONTINUED | OUTPATIENT
Start: 2018-11-09 | End: 2018-11-10 | Stop reason: HOSPADM

## 2018-11-09 RX ORDER — LOVASTATIN 20 MG
20 TABLET ORAL AT BEDTIME
Status: DISCONTINUED | OUTPATIENT
Start: 2018-11-09 | End: 2018-11-09 | Stop reason: CLARIF

## 2018-11-09 RX ORDER — BISACODYL 10 MG
10 SUPPOSITORY, RECTAL RECTAL DAILY PRN
Status: DISCONTINUED | OUTPATIENT
Start: 2018-11-09 | End: 2018-11-10 | Stop reason: HOSPADM

## 2018-11-09 RX ORDER — ACETAMINOPHEN 650 MG/1
650 SUPPOSITORY RECTAL EVERY 4 HOURS PRN
Status: DISCONTINUED | OUTPATIENT
Start: 2018-11-09 | End: 2018-11-10 | Stop reason: HOSPADM

## 2018-11-09 RX ORDER — ONDANSETRON 2 MG/ML
4 INJECTION INTRAMUSCULAR; INTRAVENOUS EVERY 30 MIN PRN
Status: DISCONTINUED | OUTPATIENT
Start: 2018-11-09 | End: 2018-11-10 | Stop reason: HOSPADM

## 2018-11-09 RX ORDER — SODIUM CHLORIDE 9 MG/ML
INJECTION, SOLUTION INTRAVENOUS CONTINUOUS
Status: DISCONTINUED | OUTPATIENT
Start: 2018-11-09 | End: 2018-11-10

## 2018-11-09 RX ORDER — PROCHLORPERAZINE 25 MG
12.5 SUPPOSITORY, RECTAL RECTAL EVERY 12 HOURS PRN
Status: DISCONTINUED | OUTPATIENT
Start: 2018-11-09 | End: 2018-11-10 | Stop reason: HOSPADM

## 2018-11-09 RX ORDER — MORPHINE SULFATE 2 MG/ML
2-4 INJECTION, SOLUTION INTRAMUSCULAR; INTRAVENOUS
Status: DISCONTINUED | OUTPATIENT
Start: 2018-11-09 | End: 2018-11-10 | Stop reason: HOSPADM

## 2018-11-09 RX ORDER — ONDANSETRON 2 MG/ML
4 INJECTION INTRAMUSCULAR; INTRAVENOUS EVERY 6 HOURS PRN
Status: DISCONTINUED | OUTPATIENT
Start: 2018-11-09 | End: 2018-11-10 | Stop reason: HOSPADM

## 2018-11-09 RX ADMIN — SODIUM CHLORIDE: 900 INJECTION, SOLUTION INTRAVENOUS at 16:53

## 2018-11-09 RX ADMIN — ATORVASTATIN CALCIUM 10 MG: 10 TABLET, FILM COATED ORAL at 21:03

## 2018-11-09 RX ADMIN — SODIUM CHLORIDE: 900 INJECTION, SOLUTION INTRAVENOUS at 14:38

## 2018-11-09 RX ADMIN — IOHEXOL 100 ML: 350 INJECTION, SOLUTION INTRAVENOUS at 13:29

## 2018-11-09 RX ADMIN — TAMSULOSIN HYDROCHLORIDE 0.4 MG: 0.4 CAPSULE ORAL at 17:20

## 2018-11-09 RX ADMIN — SODIUM CHLORIDE: 900 INJECTION, SOLUTION INTRAVENOUS at 22:11

## 2018-11-09 RX ADMIN — ONDANSETRON 4 MG: 2 INJECTION INTRAMUSCULAR; INTRAVENOUS at 11:19

## 2018-11-09 ASSESSMENT — ENCOUNTER SYMPTOMS
WEAKNESS: 0
ABDOMINAL PAIN: 1
FREQUENCY: 0
ABDOMINAL DISTENTION: 1
NAUSEA: 1
CHILLS: 0
DYSURIA: 0
CONSTIPATION: 1
VOMITING: 0
FEVER: 0
BLOOD IN STOOL: 0

## 2018-11-09 NOTE — H&P
[facility]    History and Physical  Hospitalist       Date of Admission:  11/9/2018  Date of Service (when I saw the patient): 11/09/18    Assessment & Plan   Diane Ricardo is a 73 year old female who presents with L flank pain x 6 days.     L 3mm UPJ with mild to moderate HN. Cr normal. UA w/o signs of infection. ED doc d/w Urology at [facility] who advised IVFs and Flomax through today and if no stone passage, transfer in AM. NPO after [place] in case procedure can be done in AM.     ASA for primary prevention- Hold for now.     Hyperlipidemia- Statin    Active Problems:    * No active hospital problems. *    DVT Prophylaxis: Pneumatic Compression Devices and Anti-embolisim stockings (TEDs)  Code Status: Full Code    Disposition: Expected discharge in 1 day.    Amanda Spencer    Primary Care Physician   Savita Tariq    Chief Complaint   L flank pain    History of Present Illness   72 y/o female with L flank pain x 6 days. No fever or chills. No emesis or diarrhea. Not been eating or drinking much. Given pain meds and fluids in ED and now feels fine.     History is obtained from the patient and emergency department physician    Past Medical History    I have reviewed this patient's medical history and updated it with pertinent information if needed.   Past Medical History:   Diagnosis Date     Elevated blood pressure reading without diagnosis of hypertension     Elevated in clinic, monitors at home with normal values.     Hyperlipidemia     11/21/2011     Prediabetes     11/24/2015       Past Surgical History   I have reviewed this patient's surgical history and updated it with pertinent information if needed.  Past Surgical History:   Procedure Laterality Date     LAPAROSCOPIC CHOLECYSTECTOMY      1997,Laparoscopic cholecystectomy     TONSILLECTOMY, ADENOIDECTOMY, COMBINED      as a child       Prior to Admission Medications   Prior to Admission Medications   Prescriptions Last Dose Informant  Patient Reported? Taking?   Calcium Carb-Cholecalciferol (CALCIUM 600 + D PO) 11/8/2018 at Unknown time  Yes Yes   Sig: Take by mouth 2 times daily   Multiple Vitamins-Minerals (ICAPS MV PO) 11/8/2018 at Unknown time  Yes Yes   Sig: Take 4 capsules by mouth daily   aspirin 81 MG tablet 11/8/2018 at Unknown time  Yes Yes   Sig: Take 81 mg by mouth daily with food   lovastatin (MEVACOR) 20 MG tablet 11/8/2018 at Unknown time  Yes Yes   Sig: Take 20 mg by mouth At Bedtime      Facility-Administered Medications: None     Allergies   No Known Allergies    Social History   I have reviewed this patient's social history and updated it with pertinent information if needed. Diane Ricardo  reports that she quit smoking about 32 years ago. Her smoking use included Cigarettes. She has a 8.80 pack-year smoking history. She has never used smokeless tobacco. She reports that she does not drink alcohol or use illicit drugs.    Family History   I have reviewed this patient's family history and updated it with pertinent information if needed.   Family History   Problem Relation Age of Onset     Cancer Mother      Cancer,Spinal Cancer     Cancer Maternal Aunt      Cancer,Hodgkin's lymphoma     Prostate Cancer Other      Cancer-prostate     Cancer Other      Cancer,thyroid cancer     Unknown/Adopted Father      Unknown     Myocardial Infarction Maternal Grandmother 74     Heart attack     Asthma Maternal Grandfather      Asthma     Other - See Comments Son      No Known Problems     Breast Cancer No family hx of      Cancer-breast       Review of Systems   The 10 point Review of Systems is negative other than noted in the HPI or here.     Physical Exam   Temp: 98.2  F (36.8  C) Temp src: Tympanic BP: 151/86   Heart Rate: 81 Resp: 20 SpO2: 98 %      Vital Signs with Ranges  Temp:  [98.2  F (36.8  C)] 98.2  F (36.8  C)  Heart Rate:  [81] 81  Resp:  [20] 20  BP: (134-178)/(61-96) 151/86  SpO2:  [96 %-99 %] 98 %  213 lbs 10.01  oz    Constitutional: In NAD  Eyes: No discharge  HEENT: Dry MM  Respiratory: CTA B  Cardiovascular: S1 + S2  GI: Soft, NT, ND  Genitourinary:  L flank tenderness mild.   Skin: No acute rashes/lesions.   Musculoskeletal: No pedal edema.  Neurologic: Grossly non-focal.   Psychiatric: Affect normal.     Data   Data reviewed today:  I personally reviewed all Dxics below.     Recent Labs  Lab 11/09/18  1030   WBC 8.2   HGB 13.6   MCV 87         POTASSIUM 4.1   CHLORIDE 109*   CO2 22   BUN 15   CR 0.90   ANIONGAP 11   JONN 9.4   *   ALBUMIN 4.0   PROTTOTAL 6.9   BILITOTAL 0.5   ALKPHOS 63   ALT 12   AST 17       Lactic Acid   Date Value Ref Range Status   11/09/2018 1.0 0.5 - 2.2 mmol/L Final       Recent Results (from the past 24 hour(s))   CT Abdomen Pelvis w Contrast    Narrative    PROCEDURE:  CT ABDOMEN PELVIS W CONTRAST    HISTORY: Left sided abdominal pain R/o Diverticulitis vs  constipation.;     TECHNIQUE:  Helical CT of the abdomen and pelvis was performed  following injection of intravenous contrast.  Ingested oral contrast  partially opacifies the bowel.      COMPARISON:  None.    MEDS/CONTRAST: 100 mL Omnipaque.    FINDINGS:      Limited images through the lung bases demonstrate no focal  consolidation or mass.  The heart size is normal. No pericardial or  pleural effusions are seen.    There is left hydroureteronephrosis secondary to a 3 mm stone at the  left ureteropelvic junction. There is also a 3 mm stone within the mid  to distal left ureter just below the pelvic inlet. There is a slightly  delayed but homogenous nephrogram on the left. No additional  collecting system calculi are identified.    The liver demonstrates no mass or intrahepatic biliary ductal  dilatation. The gallbladder, spleen, pancreas and adrenal glands are  unremarkable. There is no abdominal aortic aneurysm. The bowel is  normal in caliber. The appendix is normal.    No free air or adenopathy is present.  No  suspicious osseous lesions  are identified.      Impression    IMPRESSION:      Mild to moderate left hydronephrosis secondary to a 3 mm stone at the  left ureteropelvic junction. There is also a 3 mm stone in the mid to  distal left ureter.    LELIA OLIVER MD

## 2018-11-09 NOTE — ED PROVIDER NOTES
History     Chief Complaint   Patient presents with     Abdominal Pain     HPI  Diane Ricardo is a 73 year old female who presenting with's left-sided abdominal pain for 5 days.  Patient reports that her pain was insidious in onset and described as crampy pain associated with bloating of her stoma and pain located both on the left upper and lower abdominal quadrants.  She has not had a bowel movements in the last 3 days and reports that each time she tries to go she only passing gas.  She denies blood in stool prior to the onset of her abdominal pain she has always had a bowel movement daily.  Her last bowel movement was 3 days ago and she said it was loose.  She denies any fever at this time however reports chills and shaking 3 days ago.  She reports to have had multiple episodes of dry heaves this morning but no vomiting.  She has never had similar pain like this before.  She reports that she has not had a colonoscopy over 10 years.  She denies nausea at this time.  Her abdominal pain rates 7/10 at its worse but now 3/10.    Problem List:    Patient Active Problem List    Diagnosis Date Noted     Elevated blood pressure reading without diagnosis of hypertension 02/26/2018     Priority: Medium     Overview:   Elevated in clinic, monitors at home with normal values.         Prediabetes 11/24/2015     Priority: Medium     ACP (advance care planning) 12/11/2013     Priority: Medium     Hyperlipidemia 11/21/2011     Priority: Medium        Past Medical History:    Past Medical History:   Diagnosis Date     Elevated blood pressure reading without diagnosis of hypertension      Hyperlipidemia      Prediabetes        Past Surgical History:    Past Surgical History:   Procedure Laterality Date     LAPAROSCOPIC CHOLECYSTECTOMY      1997,Laparoscopic cholecystectomy     TONSILLECTOMY, ADENOIDECTOMY, COMBINED      as a child       Family History:    Family History   Problem Relation Age of Onset     Cancer Mother       "Cancer,Spinal Cancer     Cancer Maternal Aunt      Cancer,Hodgkin's lymphoma     Prostate Cancer Other      Cancer-prostate     Cancer Other      Cancer,thyroid cancer     Unknown/Adopted Father      Unknown     Myocardial Infarction Maternal Grandmother 74     Heart attack     Asthma Maternal Grandfather      Asthma     Other - See Comments Son      No Known Problems     Breast Cancer No family hx of      Cancer-breast       Social History:  Marital Status:   [2]  Social History   Substance Use Topics     Smoking status: Former Smoker     Packs/day: 0.40     Years: 22.00     Types: Cigarettes     Quit date: 1/1/1986     Smokeless tobacco: Never Used     Alcohol use No      Comment: Alcoholic Drinks/day: occasionally        Medications:      aspirin 81 MG tablet   Calcium Carb-Cholecalciferol (CALCIUM 600 + D PO)   lovastatin (MEVACOR) 20 MG tablet   Multiple Vitamins-Minerals (ICAPS MV PO)         Review of Systems   Constitutional: Negative for chills and fever.   Cardiovascular: Negative for chest pain.   Gastrointestinal: Positive for abdominal distention, abdominal pain, constipation and nausea. Negative for blood in stool and vomiting.   Genitourinary: Negative for dysuria and frequency.   Neurological: Negative for weakness.       Physical Exam   BP: 178/65  Heart Rate: 81  Temp: 98.2  F (36.8  C)  Resp: 20  Height: 154.9 cm (5' 1\")  Weight: 96.9 kg (213 lb 10 oz)  SpO2: 96 %      Physical Exam   Constitutional: She is oriented to person, place, and time. She appears well-developed and well-nourished. No distress.   HENT:   Head: Normocephalic and atraumatic.   Eyes: Pupils are equal, round, and reactive to light.   Neck: Normal range of motion.   Cardiovascular: Normal rate, regular rhythm and normal heart sounds.    Pulmonary/Chest: Effort normal.   Abdominal: Soft. She exhibits no distension. There is no tenderness. There is no rebound and no guarding.   Abdomen obese but soft.  Hypoactive bowel " sounds.   Musculoskeletal: Normal range of motion.   Neurological: She is alert and oriented to person, place, and time. No cranial nerve deficit.       ED Course     ED Course     Procedures    Critical Care time:  none  Results for orders placed or performed during the hospital encounter of 11/09/18 (from the past 24 hour(s))   CBC with platelets differential   Result Value Ref Range    WBC 8.2 4.0 - 11.0 10e9/L    RBC Count 4.61 3.8 - 5.2 10e12/L    Hemoglobin 13.6 11.7 - 15.7 g/dL    Hematocrit 40.3 35.0 - 47.0 %    MCV 87 78 - 100 fl    MCH 29.5 26.5 - 33.0 pg    MCHC 33.7 31.5 - 36.5 g/dL    RDW 12.8 10.0 - 15.0 %    Platelet Count 198 150 - 450 10e9/L    Diff Method Automated Method     % Neutrophils 82.5 %    % Lymphocytes 11.6 %    % Monocytes 5.1 %    % Eosinophils 0.1 %    % Basophils 0.2 %    % Immature Granulocytes 0.5 %    Absolute Neutrophil 6.7 1.6 - 8.3 10e9/L    Absolute Lymphocytes 1.0 0.8 - 5.3 10e9/L    Absolute Monocytes 0.4 0.0 - 1.3 10e9/L    Absolute Eosinophils 0.0 0.0 - 0.7 10e9/L    Absolute Basophils 0.0 0.0 - 0.2 10e9/L    Abs Immature Granulocytes 0.0 0 - 0.4 10e9/L   Comprehensive metabolic panel   Result Value Ref Range    Sodium 142 134 - 144 mmol/L    Potassium 4.1 3.5 - 5.1 mmol/L    Chloride 109 (H) 98 - 107 mmol/L    Carbon Dioxide 22 21 - 31 mmol/L    Anion Gap 11 3 - 14 mmol/L    Glucose 126 (H) 70 - 105 mg/dL    Urea Nitrogen 15 7 - 25 mg/dL    Creatinine 0.90 0.60 - 1.20 mg/dL    GFR Estimate 61 >60 mL/min/1.7m2    GFR Estimate If Black 74 >60 mL/min/1.7m2    Calcium 9.4 8.6 - 10.3 mg/dL    Bilirubin Total 0.5 0.3 - 1.0 mg/dL    Albumin 4.0 3.5 - 5.7 g/dL    Protein Total 6.9 6.4 - 8.9 g/dL    Alkaline Phosphatase 63 34 - 104 U/L    ALT 12 7 - 52 U/L    AST 17 13 - 39 U/L   Lactic acid   Result Value Ref Range    Lactic Acid 1.0 0.5 - 2.2 mmol/L   UA reflex to Microscopic and Culture   Result Value Ref Range    Color Urine Yellow     Appearance Urine Clear     Glucose Urine  Negative NEG^Negative mg/dL    Bilirubin Urine Negative NEG^Negative    Ketones Urine Negative NEG^Negative mg/dL    Specific Gravity Urine 1.015 1.000 - 1.030    Blood Urine Large (A) NEG^Negative    pH Urine 5.5 5.0 - 9.0 pH    Protein Albumin Urine Negative NEG^Negative mg/dL    Urobilinogen Urine 0.2 0.2 - 1.0 EU/dL    Nitrite Urine Negative NEG^Negative    Leukocyte Esterase Urine Trace (A) NEG^Negative    Source Midstream Urine    Urine Microscopic   Result Value Ref Range    WBC Urine 0 - 5 OTO5^0 - 5 /HPF    RBC Urine 10-25 (A) OTO2^O - 2 /HPF    Squamous Epithelial /LPF Urine Few FEW^Few /LPF    Bacteria Urine Few (A) NEG^Negative /HPF    Mucous Urine Present (A) NEG^Negative /LPF   CT Abdomen Pelvis w Contrast    Narrative    PROCEDURE:  CT ABDOMEN PELVIS W CONTRAST    HISTORY: Left sided abdominal pain R/o Diverticulitis vs  constipation.;     TECHNIQUE:  Helical CT of the abdomen and pelvis was performed  following injection of intravenous contrast.  Ingested oral contrast  partially opacifies the bowel.      COMPARISON:  None.    MEDS/CONTRAST: 100 mL Omnipaque.    FINDINGS:      Limited images through the lung bases demonstrate no focal  consolidation or mass.  The heart size is normal. No pericardial or  pleural effusions are seen.    There is left hydroureteronephrosis secondary to a 3 mm stone at the  left ureteropelvic junction. There is also a 3 mm stone within the mid  to distal left ureter just below the pelvic inlet. There is a slightly  delayed but homogenous nephrogram on the left. No additional  collecting system calculi are identified.    The liver demonstrates no mass or intrahepatic biliary ductal  dilatation. The gallbladder, spleen, pancreas and adrenal glands are  unremarkable. There is no abdominal aortic aneurysm. The bowel is  normal in caliber. The appendix is normal.    No free air or adenopathy is present.  No suspicious osseous lesions  are identified.      Impression     IMPRESSION:      Mild to moderate left hydronephrosis secondary to a 3 mm stone at the  left ureteropelvic junction. There is also a 3 mm stone in the mid to  distal left ureter.    LELIA OLIVER MD       Medications   ondansetron (ZOFRAN) injection 4 mg (4 mg Intravenous Given 11/9/18 1119)   sodium chloride 0.9% infusion ( Intravenous New Bag 11/9/18 1438)   iohexol (OMNIPAQUE) 350 mg/mL solution 100 mL (100 mLs Intravenous Given 11/9/18 1329)   iohexol (OMNIPAQUE) 240 mg/mL solution 50 mL (50 mLs Oral Given 11/9/18 1329)     2:36 PM  Patient doing well status post CT scan of the abdomen which reveals left kidney stone measuring about 3 mm with mild to moderate hydronephrosis.  Patient started on IV fluids and urology consult for further management.  Patient will need to be admitted.    2:53 PM  There is no urologist on call at this facility at this time.  I called stat consult to [facility] in [place] and spoke with Dr. Donahue (Urology) who recommends that patient be admitted for IV fluid hydration and pain management with expectation that she will pass the stone spontaneously.  I spoken to the hospitalist (Dr. Spencer) who is going to be admitting patient.    Assessments & Plan (with Medical Decision Making)   Patient is a 73-year-old female presenting with 6 days of left-sided abdominal pain associated with's nausea and CT scan of the abdomen revealing 3 mm ureteral kidney stone with left hydronephrosis.  Consult to urology recommends admitting patient on IV fluids and pain management with monitoring for spontaneous passage of the stone.  Patient will be admitted for IV fluid and pain management.    I have reviewed the nursing notes.    I have reviewed the findings, diagnosis, plan and need for follow up with the patient.    New Prescriptions    No medications on file       Final diagnoses:   Hydronephrosis, left   Left ureteral stone     Criss Diego MD  Emergency Medicine  Physician  11/9/2018  3:15 PM    11/9/2018   [facility]     Criss Diego MD  11/09/18 7410

## 2018-11-09 NOTE — PROGRESS NOTES
"Patient admitted to [facility] from ER.  Pain is 2/10 and patient states it is \"tolerable.\" Dr. Sun aware of admission.     JAMAR ABEBE RN on 11/9/2018 at 4:50 PM    "

## 2018-11-09 NOTE — IP AVS SNAPSHOT
[facility]    [address]    [place] [address]    Phone:  [phone]    Fax:  [phone]                                       After Visit Summary   11/9/2018    Diane Ricardo    MRN: [id number]           After Visit Summary Signature Page     I have received my discharge instructions, and my questions have been answered. I have discussed any challenges I see with this plan with the nurse or doctor.    ..........................................................................................................................................  Patient/Patient Representative Signature      ..........................................................................................................................................  Patient Representative Print Name and Relationship to Patient    ..................................................               ................................................  Date                                   Time    ..........................................................................................................................................  Reviewed by Signature/Title    ...................................................              ..............................................  Date                                               Time          22EPIC Rev 08/18

## 2018-11-09 NOTE — TELEPHONE ENCOUNTER
"Patient called today c/o severe abdominal pain. Pt soft-transferred to triage nurse for further assessment:    Reason for Disposition    [1] MILD-MODERATE pain AND [2] constant AND [3] present > 2 hours    [1] SEVERE pain (e.g., excruciating) AND [2] present > 1 hour     Denies that pain is \"excruciating\", but called in stating pain was severe.    [1] SEVERE pain AND [2] age > 60    [1] SEVERE pain (e.g., excruciating) AND [2] present > 1 hour    [1] Pain lasts > 10 minutes AND [2] age > 50    [1] Pain lasts > 10 minutes AND [2] age > 35 AND [3] at least one cardiac risk factor (i.e., hypertension, diabetes, obesity, smoker or strong family history of heart disease)    [1] MILD-MODERATE pain AND [2] constant AND [3] present > 2 hours    Additional Information    Negative: Shock suspected (e.g., cold/pale/clammy skin, too weak to stand, low BP, rapid pulse)    Negative: Difficult to awaken or acting confused  (e.g., disoriented, slurred speech)    Negative: Passed out (i.e., lost consciousness, collapsed and was not responding)    Negative: Sounds like a life-threatening emergency to the triager    Negative: Chest pain    Negative: Followed an abdomen (stomach) injury    Negative: [1] Abdominal pain AND [2] pregnant < 20 weeks    Negative: [1] Abdominal pain AND [2] pregnant > 20 weeks    Negative: [1] Abdominal pain AND [2] postpartum < 1 month since delivery    Negative: [1] Vomiting AND [2] contains red blood or black (\"coffee ground\") material  (Exception: few red streaks in vomit that only happened once)    Negative: Blood in bowel movements   (Exception: blood on surface of BM with constipation)    Negative: Black or tarry bowel movements  (Exception: chronic-unchanged  black-grey bowel movements AND is taking iron pills or Pepto-bismol)    Negative: Patient sounds very sick or weak to the triager    Negative: [1] Vomiting AND [2] abdomen looks much more swollen than usual    Negative: [1] Vomiting AND [2] " "contains bile (green color)    Negative: White of the eyes have turned yellow (i.e., jaundice)    Negative: Fever > 103 F (39.4 C)    Negative: [1] Fever > 101 F (38.3 C) AND [2] bedridden (e.g., nursing home patient, CVA, chronic illness, recovering from surgery)    Negative: [1] Fever > 101 F (38.3 C) AND [2] age > 60    Negative: [1] Fever > 100.5 F (38.1 C) AND [2] diabetes mellitus or weak immune system (e.g., HIV positive, cancer chemo, splenectomy, organ transplant, chronic steroids)    Pain is mainly in upper abdomen  (if needed ask: \"is it mainly above the belly button?\")    Negative: Severe difficulty breathing (e.g., struggling for each breath, speaks in single words)    Negative: Shock suspected (e.g., cold/pale/clammy skin, too weak to stand, low BP, rapid pulse)    Negative: Difficult to awaken or acting confused  (e.g., disoriented, slurred speech)    Negative: Passed out (i.e., lost consciousness, collapsed and was not responding)    Negative: Visible sweat on face or sweat dripping down face    Negative: Sounds like a life-threatening emergency to the triager    Negative: Followed an abdomen (stomach) injury    Negative: Chest pain    Negative: [1] Pain lasts > 10 minutes AND [2] age > 40 AND [3] associated chest, arm, neck, upper back or jaw pain    Negative: [1] Pain lasts > 10 minutes AND [2] history of heart disease (i.e., heart attack, bypass surgery, angina, angioplasty, CHF; not just a heart murmur)    Negative: Black or tarry bowel movements  (Exception: chronic-unchanged  black-grey bowel movements AND is taking iron pills or Pepto-bismol)    Negative: Blood in bowel movements  (Exception: Blood on surface of BM with constipation)    Negative: [1] Vomiting AND [2] contains black (\"coffee ground\") material    Negative: [1] Vomiting AND [2] contains red blood  (Exception: few streaks and only occurred once)    Negative: [1] Pain lasts > 10 minutes AND [2] difficulty breathing    Negative: [1] " "Pregnant > 24 weeks AND [2] hand or face swelling    Negative: Patient sounds very sick or weak to the triager    Answer Assessment - Initial Assessment Questions  Left side  Low tolerance  Off/on since sun night  Gas   Burning  Dry heaves  Steady since 2 AM, lessened again    Pt having severe abd pain.    No history of heart disease    Gas, burning, pain    1. LOCATION: \"Where does it hurt?\"   Upper left abdominal pain.    2. RADIATION: \"Does the pain shoot anywhere else?\" (e.g., chest, back)  Denies    3. ONSET: \"When did the pain begin?\" (e.g., minutes, hours or days ago)   Sunday night.    4. SUDDEN: \"Gradual or sudden onset?\"  Sudden.    5. PATTERN \"Does the pain come and go, or is it constant?\"  If intermittent: \"How long does it last?\" \"Do you have pain now?\"  Pain greater than 10 minutes at times, woke Patient up in the night, was constant since 2 AM, but has subsided at the moment.    6. SEVERITY: \"How bad is the pain?\"  (e.g., Scale 1-10; mild, moderate, or severe)    - MILD (1-3): doesn't interfere with normal activities, abdomen soft and not tender to touch     - MODERATE (4-7): interferes with normal activities or awakens from sleep, tender to touch     - SEVERE (8-10): excruciating pain, doubled over, unable to do any normal activities   Patient unable to rate, stating she has a low tolerance for pain. \"Extremelyu uncomfortable, can't sleeps, wakes her up at night, but able to do normal activities on and off through week; \"not bed-ridden,\"    7. RECURRENT SYMPTOM: \"Have you ever had this type of abdominal pain before?\" If so, ask: \"When was the last time?\" and \"What happened that time?\"   She has stomach issues before, but this time \"the pain is different.\"    8. CAUSE: \"What do you think is causing the abdominal pain?\"  Unsure.    9. RELIEVING/AGGRAVATING FACTORS: \"What makes it better or worse?\" (e.g., movement, antacids, bowel movement)  Heat has not helped, has not been eating or drinking much of " "anything. (Denies sx of dehydration). Worsens on its own, not worse with certain movements, etc.    10. OTHER SYMPTOMS:  Constipation about 1 wk ago, Sun or Mon. Night- had \"a little diarrhea.\" Dry heaving, no vomiting.  Dry heaving, no vomiting    Protocols used: ABDOMINAL PAIN - FEMALE-ADULT-AH, ABDOMINAL PAIN - UPPER-ADULT-AH    Per triage protocol, it was recommended that Patient be evaluated in the ED. Pt given care advice. The Pt indicates understanding of these issues and agrees with the plan and confirmed that she has someone who can drive her in. Writer will close encounter at this time and follow up as needed. Ana Vance RN .............. 11/9/2018  9:20 AM      "

## 2018-11-09 NOTE — IP AVS SNAPSHOT
MRN:[id number]                      After Visit Summary   11/9/2018    Diane Ricardo    MRN: [id number]           Thank you!     Thank you for choosing [facility] for your care. Our goal is always to provide you with excellent care. Hearing back from our patients is one way we can continue to improve our services. Please take a few minutes to complete the written survey that you may receive in the mail after you visit with us. Thank you!        Patient Information     Date Of Birth          1945        About your hospital stay     You were admitted on:  November 9, 2018 You last received care in the:  [facility]    You were discharged on:  November 10, 2018        Reason for your hospital stay       L ureteral stone 3mm                  Who to Call     For medical emergencies, please call 911.  For non-urgent questions about your medical care, please call your primary care provider or clinic, [phone]          Attending Provider     Provider Specialty    Criss Diego MD Emergency Medicine    [facility]       Primary Care Provider Office Phone # Fax #    Savita Tariq [phone]      After Care Instructions     Activity       Your activity upon discharge: activity as tolerated            Diet       Follow this diet upon discharge: Orders Placed This Encounter  Regular diet, drink plenty of fluids.            Discharge Instructions       Return to ED or clinic if flank pain returns or if you have nausea/vomiting or fever.                  Follow-up Appointments     Follow-up and recommended labs and tests        Follow up with primary care provider, Savita Tariq, within 7 days for hospital follow- up.  The following labs/tests are recommended: BMP.                  Your next 10 appointments already scheduled     Nov 21, 2018 10:00 AM CST   PHYSICAL with Saima Engle CNP   [facility]  [facility])    [address]  [place] [address]   [phone]            Nov 30, 2018  9:15 AM CST   (Arrive by 9:00 AM)   MA SCREENING BILATERAL W/ LEEANN with GHMA2   [facility] ([facility])    [address]  [place] [address]   [phone]           How do I prepare for my exam? (Food and drink instructions) No Food and Drink Restrictions.  How do I prepare for my exam? (Other instructions) Do not use any powder, lotion or deodorant under your arms or on your breast. If you do, we will ask you to remove it before your exam.  What should I wear: Wear comfortable, two-piece clothing.  How long does the exam take: Most scans will take 15 minutes.  What should I bring: Bring any previous mammograms from other facilities or have them mailed to the breast center.  Do I need a :  No  is needed.  What do I need to tell my doctor: If you have any allergies, tell your care team.  What should I do after the exam: No restrictions, You may resume normal activities.  What is this test: This test is an x-ray of the breast to look for breast disease. The breast is pressed between two plates to flatten and spread the tissue. An X-ray is taken of the breast from different angles.  Who should I call with questions: If you have any questions, please call the Imaging Department where you will have your exam. Directions, parking instructions, and other information is available on our website, [email or web address].  Other information about my exam Three-dimensional (3D) mammograms are available at [facility] locations in [facility], [facility], [facility], [place], and [place].  Health locations include [place] and the [facility] in [place].  Benefits of 3D mammograms include: * Improved rate of cancer detection * Decreases your chance of having to go back for more tests, which means fewer: *  "\"False-positive\" results (This means that there is an abnormal area but it isn't cancer.) * Invasive testing procedures, such as a biopsy or surgery * Can provide clearer images of the breast if you have dense breast tissue.  *3D mammography is an optional exam that anyone can have with a 2D mammogram. It doesn't replace or take the place of a 2D mammogram. 2D mammograms remain an effective screening test for all women.  Not all insurance companies cover the cost of a 3D mammogram. Check with your insurance.              Pending Results     No orders found for last 3 day(s).            Statement of Approval     Ordered          11/10/18 0715  I have reviewed and agree with all the recommendations and orders detailed in this document.  EFFECTIVE NOW     Approved and electronically signed by:  Amanda Spencer MD             Admission Information     Date & Time Provider Department Dept. Phone    11/9/2018 Amanda Spencer MD [facility] [phone]      Your Vitals Were     Blood Pressure Pulse Temperature Respirations Height Weight    134/63 79 97.9  F (36.6  C) (Tympanic) 17 1.549 m (5' 1\") 99 kg (218 lb 3.2 oz)    Pulse Oximetry BMI (Body Mass Index)                96% 41.23 kg/m2          Care EveryWhere ID     This is your Care EveryWhere ID. This could be used by other organizations to access your [facility] medical records  [id number]        Equal Access to Services     ALMAS REYNOLDS AH: Hadii viktor solero Sothomas, waaxda luqadaha, qaybta kaalmada smita, victoriano vanegas. So [facility] [phone].    ATENCIÓN: Si habla español, tiene a latif disposición servicios gratuitos de asistencia lingüística. Llame al [phone].    We comply with applicable federal civil rights laws and [place] laws. We do not discriminate on the basis of race, color, national origin, age, disability, sex, sexual orientation, or gender identity.               Review of your medicines    "   START taking        Dose / Directions    tamsulosin 0.4 MG capsule   Commonly known as:  FLOMAX   Used for:  Left ureteral stone        Dose:  0.4 mg   Take 1 capsule (0.4 mg) by mouth daily   Quantity:  10 capsule   Refills:  0         CONTINUE these medicines which have NOT CHANGED        Dose / Directions    aspirin 81 MG tablet        Dose:  81 mg   Take 81 mg by mouth daily with food   Refills:  0       CALCIUM 600 + D PO        Take by mouth 2 times daily   Refills:  0       ICAPS MV PO        Dose:  4 capsule   Take 4 capsules by mouth daily   Refills:  0       lovastatin 20 MG tablet   Commonly known as:  MEVACOR        Dose:  20 mg   Take 20 mg by mouth At Bedtime   Refills:  0            Where to get your medicines      These medications were sent to [facility] [address] [place] [address]     Phone:  [phone]     tamsulosin 0.4 MG capsule                Protect others around you: Learn how to safely use, store and throw away your medicines at www.disposemymeds.org.             Medication List: This is a list of all your medications and when to take them. Check marks below indicate your daily home schedule. Keep this list as a reference.      Medications           Morning Afternoon Evening Bedtime As Needed    aspirin 81 MG tablet   Take 81 mg by mouth daily with food                                CALCIUM 600 + D PO   Take by mouth 2 times daily                                ICAPS MV PO   Take 4 capsules by mouth daily                                lovastatin 20 MG tablet   Commonly known as:  MEVACOR   Take 20 mg by mouth At Bedtime                                tamsulosin 0.4 MG capsule   Commonly known as:  FLOMAX   Take 1 capsule (0.4 mg) by mouth daily   Last time this was given:  0.4 mg on 11/10/2018  7:36 AM

## 2018-11-09 NOTE — ED TRIAGE NOTES
Patient arrives from home with c/o left sided abdominal pain that has been intermittent since Sunday. She had dry heaves today. Last BM was 2-3 days ago, appetite has been poor. Unsure if she has had a fever, but had chills a couple days ago.

## 2018-11-10 VITALS
HEIGHT: 61 IN | WEIGHT: 218.2 LBS | HEART RATE: 79 BPM | SYSTOLIC BLOOD PRESSURE: 134 MMHG | DIASTOLIC BLOOD PRESSURE: 63 MMHG | OXYGEN SATURATION: 96 % | TEMPERATURE: 97.9 F | BODY MASS INDEX: 41.19 KG/M2 | RESPIRATION RATE: 17 BRPM

## 2018-11-10 LAB
ANION GAP SERPL CALCULATED.3IONS-SCNC: 8 MMOL/L (ref 3–14)
BUN SERPL-MCNC: 14 MG/DL (ref 7–25)
CALCIUM SERPL-MCNC: 8.6 MG/DL (ref 8.6–10.3)
CHLORIDE SERPL-SCNC: 112 MMOL/L (ref 98–107)
CO2 SERPL-SCNC: 26 MMOL/L (ref 21–31)
CREAT SERPL-MCNC: 0.81 MG/DL (ref 0.6–1.2)
GFR SERPL CREATININE-BSD FRML MDRD: 69 ML/MIN/1.7M2
GLUCOSE SERPL-MCNC: 107 MG/DL (ref 70–105)
POTASSIUM SERPL-SCNC: 3.5 MMOL/L (ref 3.5–5.1)
SODIUM SERPL-SCNC: 146 MMOL/L (ref 134–144)

## 2018-11-10 PROCEDURE — 25000128 H RX IP 250 OP 636: Performed by: INTERNAL MEDICINE

## 2018-11-10 PROCEDURE — 25000132 ZZH RX MED GY IP 250 OP 250 PS 637: Performed by: INTERNAL MEDICINE

## 2018-11-10 PROCEDURE — 99217 ZZC OBSERVATION CARE DISCHARGE: CPT | Performed by: INTERNAL MEDICINE

## 2018-11-10 PROCEDURE — 80048 BASIC METABOLIC PNL TOTAL CA: CPT | Performed by: INTERNAL MEDICINE

## 2018-11-10 PROCEDURE — G0378 HOSPITAL OBSERVATION PER HR: HCPCS

## 2018-11-10 RX ORDER — TAMSULOSIN HYDROCHLORIDE 0.4 MG/1
0.4 CAPSULE ORAL DAILY
Qty: 10 CAPSULE | Refills: 0 | Status: SHIPPED | OUTPATIENT
Start: 2018-11-10 | End: 2018-11-21

## 2018-11-10 RX ADMIN — SODIUM CHLORIDE: 900 INJECTION, SOLUTION INTRAVENOUS at 04:45

## 2018-11-10 RX ADMIN — TAMSULOSIN HYDROCHLORIDE 0.4 MG: 0.4 CAPSULE ORAL at 07:36

## 2018-11-10 NOTE — PLAN OF CARE
Problem: Pain, Acute (Adult)  Goal: Acceptable Pain Control/Comfort Level  Patient will demonstrate the desired outcomes by discharge/transition of care.   Outcome: No Change  Pt denies having pain. Urine is being strained VSS. Lung sounds clear. Pt states that she has not had a BM in 3 days. Shannon Odom RN on 11/10/2018 at 12:35 AM

## 2018-11-10 NOTE — PHARMACY - DISCHARGE MEDICATION RECONCILIATION AND EDUCATION
Pharmacy:  Discharge Counseling and Medication Reconciliation    Diane Ricardo  [address]  [place] [address]  [phone] (home)   73 year old female  PCP: Savita Tariq    Allergies: Review of patient's allergies indicates no known allergies.    Discharge Counseling:    Pharmacist met with patient (and/or family) today to review the medication portion of the After Visit Summary (with an emphasis on NEW medications) and to address patient's questions/concerns.    Summary of Education: indication, correct use, side effects, and toxicity precautions discussed regarding new medications      Materials Provided:  MedCounselor sheets printed from Clinical Pharmacology on: Flomax    Discharge Medication Reconciliation:    Colleen Curran RPH has reviewed the patient's discharge medication orders and has compared them to the inpatient medication administration record and to what the patient was taking prior to admission - any discrepancies have been resolved.    It has been determined that the patient has an adequate supply of medications available or which can be obtained from the patient's preferred pharmacy.    Thank you for the consult.    Colleen Curran RPH........November 10, 2018 8:21 AM

## 2018-11-10 NOTE — PLAN OF CARE
"Problem: Patient Care Overview  Goal: Plan of Care/Patient Progress Review  Outcome: Therapy, progress toward functional goals as expected   11/09/18 1804   OTHER   Plan Of Care Reviewed With patient   Plan of Care Review   Progress no change     Patient has stable VS.  Continuing to strain urine to check for passage of kidney stone.  Patient rates her pain 2/10 on pain scale, \"tolerable and dull\" denies intervention.  Patient independent in room.  She is voiding.  IV fluids running.  Patient aware she will be NPO after midnight tonight.  Ate and tolerated dinner.      JAMAR ABEBE RN on 11/9/2018 at 6:05 PM        "

## 2018-11-10 NOTE — PROGRESS NOTES
Discharge Note    Data:  Diane Ricardo discharged to home at 0815 via ambulation. Accompanied by spouse and staff.    Action:  Written discharge/follow-up instructions were provided to patient. Prescriptions sent to patients preferred pharmacy and was given Flomax prior to discharge per MD request. Discussed with patient the next time she should take the medication would be tomorrow.All belongings sent with patient.  Patient was given strainer to use at home for urinary output and encouraged to drink plenty of fluids.    Response:  Patient verbalized understanding of discharge instructions, reason for discharge, and necessary follow-up appointments. Patient verbalized understanding of knowing when to come in again, if needed.

## 2018-11-10 NOTE — PLAN OF CARE
"Problem: Pain, Acute (Adult)  Goal: Acceptable Pain Control/Comfort Level  Patient will demonstrate the desired outcomes by discharge/transition of care.   Outcome: No Change  Pt continues to deny pain and nausea. Urine strained with no sediment noted. Pt ambulates independently to bathroom. Lung sounds clear. /62  Pulse 79  Temp 98.6  F (37  C) (Tympanic)  Resp 18  Ht 1.549 m (5' 1\")  Wt 99 kg (218 lb 3.2 oz)  SpO2 98%  BMI 41.23 kg/m2 room air. Shannon Odom RN on 11/10/2018 at 5:22 AM           "

## 2018-11-10 NOTE — DISCHARGE SUMMARY
[facility]    Discharge Summary  Hospitalist    Date of Admission:  11/9/2018  Date of Discharge:  11/10/2018  Discharging Provider: Amanda Spencer  Date of Service (when I saw the patient): 11/10/18    Discharge Diagnoses   L UPJ (ureteral) stone    History of Present Illness   Diane Ricardo is an 73 year old female who presented with L flank pain. Pls see H&P for details.     Hospital Course   Diane Ricardo was admitted on 11/9/2018.  The following problems were addressed during her hospitalization:    L 3mm UPJ with mild to moderate HN. Cr normal. UA w/o signs of infection. ED doc d/w Urology at [facility] who advised IVFs and Flomax through 11/9 and if no stone passage and symptoms stay to transfer. The pt has been using a strainer and has not passed a stone. She has been completely symptom free all night long. discharge with instructions to drink plenty of fluids, Flomax and strain her urine. If stone passes great. If symptoms, return to ED. O/w PCP appt for 11/21 at which point BMP advised.      ASA for primary prevention     Hyperlipidemia- Statin    Active Problems:    Ureteral stone      Amanda Spencer    Code Status   Full Code       Primary Care Physician   Savita Tariq        Discharge Disposition   Discharged to home  Condition at discharge: Stable    Consultations This Hospital Stay   None    Time Spent on this Encounter   I, Amanda Spencer, personally saw the patient today and spent less than or equal to 30 minutes discharging this patient.    Discharge Orders     Reason for your hospital stay   L ureteral stone 3mm     Follow-up and recommended labs and tests    Follow up with primary care provider, Savita Tariq, within 7 days for hospital follow- up.  The following labs/tests are recommended: BMP.     Activity   Your activity upon discharge: activity as tolerated     Discharge Instructions   Return to ED or clinic if flank pain returns or if you have nausea/vomiting or  fever.     Diet   Follow this diet upon discharge: Orders Placed This Encounter  Regular diet, drink plenty of fluids.       Discharge Medications   Current Discharge Medication List      START taking these medications    Details   tamsulosin (FLOMAX) 0.4 MG capsule Take 1 capsule (0.4 mg) by mouth daily  Qty: 10 capsule, Refills: 0    Associated Diagnoses: Left ureteral stone         CONTINUE these medications which have NOT CHANGED    Details   aspirin 81 MG tablet Take 81 mg by mouth daily with food      Calcium Carb-Cholecalciferol (CALCIUM 600 + D PO) Take by mouth 2 times daily      lovastatin (MEVACOR) 20 MG tablet Take 20 mg by mouth At Bedtime      Multiple Vitamins-Minerals (ICAPS MV PO) Take 4 capsules by mouth daily           Allergies   No Known Allergies  Data   Most Recent 3 CBC's:  Recent Labs   Lab Test  11/09/18   1030  11/22/17   1102  11/27/13   0956   WBC  8.2   --    --    HGB  13.6  14.5  15.1   MCV  87  90  87   PLT  198  226  285      Most Recent 3 BMP's:  Recent Labs   Lab Test  11/10/18   0542  11/09/18   1030  11/22/17   1136   NA  146*  142  141   POTASSIUM  3.5  4.1  4.3   CHLORIDE  112*  109*  105   CO2  26  22  24   BUN  14  15  17   CR  0.81  0.90  0.75   ANIONGAP  8  11   --    JONN  8.6  9.4  9.6   GLC  107*  126*  105     Most Recent 2 LFT's:  Recent Labs   Lab Test  11/09/18   1030  11/22/17   1136   AST  17   --    ALT  12   --    ALKPHOS  63  73   BILITOTAL  0.5  0.4     Most Recent INR's and Anticoagulation Dosing History:  Anticoagulation Dose History     There is no flowsheet data to display.        Most Recent 3 Troponin's:No lab results found.  Most Recent Cholesterol Panel:  Recent Labs   Lab Test  11/22/17   1136   LDL  95   HDL  54   TRIG  116     Most Recent 6 Bacteria Isolates From Any Culture (See EPIC Reports for Culture Details):No lab results found.  Most Recent TSH, T4 and A1c Labs:No lab results found.  Results for orders placed or performed during the hospital  encounter of 11/09/18   CT Abdomen Pelvis w Contrast    Narrative    PROCEDURE:  CT ABDOMEN PELVIS W CONTRAST    HISTORY: Left sided abdominal pain R/o Diverticulitis vs  constipation.;     TECHNIQUE:  Helical CT of the abdomen and pelvis was performed  following injection of intravenous contrast.  Ingested oral contrast  partially opacifies the bowel.      COMPARISON:  None.    MEDS/CONTRAST: 100 mL Omnipaque.    FINDINGS:      Limited images through the lung bases demonstrate no focal  consolidation or mass.  The heart size is normal. No pericardial or  pleural effusions are seen.    There is left hydroureteronephrosis secondary to a 3 mm stone at the  left ureteropelvic junction. There is also a 3 mm stone within the mid  to distal left ureter just below the pelvic inlet. There is a slightly  delayed but homogenous nephrogram on the left. No additional  collecting system calculi are identified.    The liver demonstrates no mass or intrahepatic biliary ductal  dilatation. The gallbladder, spleen, pancreas and adrenal glands are  unremarkable. There is no abdominal aortic aneurysm. The bowel is  normal in caliber. The appendix is normal.    No free air or adenopathy is present.  No suspicious osseous lesions  are identified.      Impression    IMPRESSION:      Mild to moderate left hydronephrosis secondary to a 3 mm stone at the  left ureteropelvic junction. There is also a 3 mm stone in the mid to  distal left ureter.    LELIA OLIVER MD

## 2018-11-10 NOTE — PROGRESS NOTES
DATE:  11/10/2018   TIME OF RECEIPT FROM LAB: 0630   LAB TEST: Troponin  LAB VALUE:  3.808  RESULTS GIVEN WITH READ-BACK TO (PROVIDER):  Dr. Spencer  TIME LAB VALUE REPORTED TO PROVIDER:   0635     No new orders placed at this time. Shannon Odom RN on 11/10/2018 at 6:35 AM

## 2018-11-21 ENCOUNTER — OFFICE VISIT (OUTPATIENT)
Dept: FAMILY MEDICINE | Facility: OTHER | Age: 73
End: 2018-11-21
Attending: NURSE PRACTITIONER
Payer: COMMERCIAL

## 2018-11-21 VITALS
HEIGHT: 61 IN | SYSTOLIC BLOOD PRESSURE: 122 MMHG | DIASTOLIC BLOOD PRESSURE: 82 MMHG | BODY MASS INDEX: 39.84 KG/M2 | WEIGHT: 211 LBS | HEART RATE: 64 BPM

## 2018-11-21 DIAGNOSIS — Z87.442 HISTORY OF RENAL CALCULI: ICD-10-CM

## 2018-11-21 DIAGNOSIS — R35.0 URINARY FREQUENCY: ICD-10-CM

## 2018-11-21 DIAGNOSIS — E78.00 PURE HYPERCHOLESTEROLEMIA: Primary | ICD-10-CM

## 2018-11-21 DIAGNOSIS — H61.21 IMPACTED CERUMEN OF RIGHT EAR: ICD-10-CM

## 2018-11-21 DIAGNOSIS — R73.03 PREDIABETES: ICD-10-CM

## 2018-11-21 LAB
ALBUMIN UR-MCNC: NEGATIVE MG/DL
APPEARANCE UR: CLEAR
BACTERIA #/AREA URNS HPF: ABNORMAL /HPF
BILIRUB UR QL STRIP: ABNORMAL
CHOLEST SERPL-MCNC: 148 MG/DL
COLOR UR AUTO: YELLOW
GLUCOSE UR STRIP-MCNC: NEGATIVE MG/DL
HBA1C MFR BLD: 5.2 % (ref 4–6)
HDLC SERPL-MCNC: 49 MG/DL (ref 23–92)
HGB UR QL STRIP: NEGATIVE
KETONES UR STRIP-MCNC: 15 MG/DL
LDLC SERPL CALC-MCNC: 78 MG/DL
LEUKOCYTE ESTERASE UR QL STRIP: ABNORMAL
MUCOUS THREADS #/AREA URNS LPF: PRESENT /LPF
NITRATE UR QL: NEGATIVE
NONHDLC SERPL-MCNC: 99 MG/DL
PH UR STRIP: 5.5 PH (ref 5–9)
RBC #/AREA URNS AUTO: ABNORMAL /HPF
SOURCE: ABNORMAL
SP GR UR STRIP: 1.02 (ref 1–1.03)
TRIGL SERPL-MCNC: 106 MG/DL
UROBILINOGEN UR STRIP-ACNC: 0.2 EU/DL (ref 0.2–1)
WBC #/AREA URNS AUTO: ABNORMAL /HPF

## 2018-11-21 PROCEDURE — 99214 OFFICE O/P EST MOD 30 MIN: CPT | Performed by: NURSE PRACTITIONER

## 2018-11-21 PROCEDURE — 36415 COLL VENOUS BLD VENIPUNCTURE: CPT | Performed by: NURSE PRACTITIONER

## 2018-11-21 PROCEDURE — 80061 LIPID PANEL: CPT | Performed by: NURSE PRACTITIONER

## 2018-11-21 PROCEDURE — 81001 URINALYSIS AUTO W/SCOPE: CPT | Performed by: NURSE PRACTITIONER

## 2018-11-21 PROCEDURE — 83036 HEMOGLOBIN GLYCOSYLATED A1C: CPT | Performed by: NURSE PRACTITIONER

## 2018-11-21 PROCEDURE — G0463 HOSPITAL OUTPT CLINIC VISIT: HCPCS

## 2018-11-21 RX ORDER — LOVASTATIN 20 MG
20 TABLET ORAL AT BEDTIME
Qty: 90 TABLET | Refills: 4 | Status: SHIPPED | OUTPATIENT
Start: 2018-11-21 | End: 2019-12-16

## 2018-11-21 ASSESSMENT — ANXIETY QUESTIONNAIRES
5. BEING SO RESTLESS THAT IT IS HARD TO SIT STILL: NOT AT ALL
IF YOU CHECKED OFF ANY PROBLEMS ON THIS QUESTIONNAIRE, HOW DIFFICULT HAVE THESE PROBLEMS MADE IT FOR YOU TO DO YOUR WORK, TAKE CARE OF THINGS AT HOME, OR GET ALONG WITH OTHER PEOPLE: NOT DIFFICULT AT ALL
GAD7 TOTAL SCORE: 0
6. BECOMING EASILY ANNOYED OR IRRITABLE: NOT AT ALL
2. NOT BEING ABLE TO STOP OR CONTROL WORRYING: NOT AT ALL
3. WORRYING TOO MUCH ABOUT DIFFERENT THINGS: NOT AT ALL
4. TROUBLE RELAXING: NOT AT ALL
7. FEELING AFRAID AS IF SOMETHING AWFUL MIGHT HAPPEN: NOT AT ALL
1. FEELING NERVOUS, ANXIOUS, OR ON EDGE: NOT AT ALL

## 2018-11-21 ASSESSMENT — PAIN SCALES - GENERAL: PAINLEVEL: NO PAIN (0)

## 2018-11-21 ASSESSMENT — PATIENT HEALTH QUESTIONNAIRE - PHQ9: SUM OF ALL RESPONSES TO PHQ QUESTIONS 1-9: 0

## 2018-11-21 NOTE — PATIENT INSTRUCTIONS
ASSESSMENT/PLAN:     1. Pure hypercholesterolemia  Chronic.   - Will re-check labs today. Continue with daily Aspirin 81 mg.  - lovastatin (MEVACOR) 20 MG tablet; Take 1 tablet (20 mg) by mouth At Bedtime  Dispense: 90 tablet; Refill: 4  - Lipid Panel; Future    2. Prediabetes  A1c last year was 5.0. Will re-check this year  - Continue with lifestyle modifications for weight and pre-diabetes including at least 30 minutes physical activity such as brisk walking at least 5 days per week, heart healthy diet, sugar/sweets intake in moderation.  - Hemoglobin A1c; Future    3. Urinary frequency  Follow-up from ED. Will obtain UA.   - Urinalysis w Reflex Microscopic If Positive    4. Impacted cerumen of right ear  Acute  - Ear irrigation.             FUTURE APPOINTMENTS:       - Follow-up visit: 1 year with PCP for routine visit.  Sooner for acute concerns.    Saima Engle CNP  [facility]

## 2018-11-21 NOTE — NURSING NOTE
Patient presents to the clinic for her yearly physical as follow up from her hospital visit.    Medication Reconciliation Completed.    Efren Webster LPN  11/21/2018 9:47 AM

## 2018-11-21 NOTE — MR AVS SNAPSHOT
After Visit Summary   11/21/2018    Diane Ricardo    MRN: [id number]           Patient Information     Date Of Birth          1945        Visit Information        Provider Department      11/21/2018 10:00 AM Saima Engle CNP [facility]        Today's Diagnoses     Pure hypercholesterolemia    -  1    Prediabetes        Urinary frequency        Impacted cerumen of right ear          Care Instructions    ASSESSMENT/PLAN:     1. Pure hypercholesterolemia  Chronic.   - Will re-check labs today. Continue with daily Aspirin 81 mg.  - lovastatin (MEVACOR) 20 MG tablet; Take 1 tablet (20 mg) by mouth At Bedtime  Dispense: 90 tablet; Refill: 4  - Lipid Panel; Future    2. Prediabetes  A1c last year was 5.0. Will re-check this year  - Continue with lifestyle modifications for weight and pre-diabetes including at least 30 minutes physical activity such as brisk walking at least 5 days per week, heart healthy diet, sugar/sweets intake in moderation.  - Hemoglobin A1c; Future    3. Urinary frequency  Follow-up from ED. Will obtain UA.   - Urinalysis w Reflex Microscopic If Positive    4. Impacted cerumen of right ear  Acute  - Ear irrigation.             FUTURE APPOINTMENTS:       - Follow-up visit: 1 year with PCP for routine visit.  Sooner for acute concerns.    Saima Engle CNP  [facility] [place] [facility]            Follow-ups after your visit        Your next 10 appointments already scheduled     Nov 30, 2018  9:15 AM CST   (Arrive by 9:00 AM)   MA SCREENING BILATERAL W/ LEEANN with GHMA2   [facility] ([facility])    [address]  [facility] [address]   [phone]           How do I prepare for my exam? (Food and drink instructions) No Food and Drink Restrictions.  How do I prepare for my exam? (Other instructions) Do not use any powder, lotion or deodorant under your arms or on your breast. If you do, we  "will ask you to remove it before your exam.  What should I wear: Wear comfortable, two-piece clothing.  How long does the exam take: Most scans will take 15 minutes.  What should I bring: Bring any previous mammograms from other facilities or have them mailed to the breast center.  Do I need a :  No  is needed.  What do I need to tell my doctor: If you have any allergies, tell your care team.  What should I do after the exam: No restrictions, You may resume normal activities.  What is this test: This test is an x-ray of the breast to look for breast disease. The breast is pressed between two plates to flatten and spread the tissue. An X-ray is taken of the breast from different angles.  Who should I call with questions: If you have any questions, please call the Imaging Department where you will have your exam. Directions, parking instructions, and other information is available on our website, [email or web address].  Other information about my exam Three-dimensional (3D) mammograms are available at [place] locations in [facility], [place], and [place]. [facility] locations include [place] and the [facility] in [place].  Benefits of 3D mammograms include: * Improved rate of cancer detection * Decreases your chance of having to go back for more tests, which means fewer: * \"False-positive\" results (This means that there is an abnormal area but it isn't cancer.) * Invasive testing procedures, such as a biopsy or surgery * Can provide clearer images of the breast if you have dense breast tissue.  *3D mammography is an optional exam that anyone can have with a 2D mammogram. It doesn't replace or take the place of a 2D mammogram. 2D mammograms remain an effective screening test for all women.  Not all insurance companies cover the cost of a 3D mammogram. Check with your insurance.              Future tests that were ordered for you today     " "Open Future Orders        Priority Expected Expires Ordered    Hemoglobin A1c Routine  11/21/2019 11/21/2018    Lipid Panel Routine  11/21/2019 11/21/2018            Who to contact     If you have questions or need follow up information about today's clinic visit or your schedule please contact [facility] directly at [phone].  Normal or non-critical lab and imaging results will be communicated to you by MyChart, letter or phone within 4 business days after the clinic has received the results. If you do not hear from us within 7 days, please contact the clinic through MyChart or phone. If you have a critical or abnormal lab result, we will notify you by phone as soon as possible.  Submit refill requests through [employer] or call your pharmacy and they will forward the refill request to us. Please allow 3 business days for your refill to be completed.          Additional Information About Your Visit        Care EveryWhere ID     This is your Care EveryWhere ID. This could be used by other organizations to access your [place] medical records  [id number]        Your Vitals Were     Pulse Height Breastfeeding? BMI (Body Mass Index)          64 5' 0.5\" (1.537 m) No 40.53 kg/m2         Blood Pressure from Last 3 Encounters:   11/21/18 122/82   11/10/18 134/63   11/22/17 170/76    Weight from Last 3 Encounters:   11/21/18 211 lb (95.7 kg)   11/10/18 218 lb 3.2 oz (99 kg)   11/22/17 208 lb 3 oz (94.4 kg)              We Performed the Following     Urinalysis w Reflex Microscopic If Positive          Where to get your medicines      These medications were sent to [facility] - [place], [place] - [address] [facility] [address] [address]     Phone:  [phone]     lovastatin 20 MG tablet          Primary Care Provider Office Phone # Fax #    Savita LEIGH Tariq [phone]       [address] [facility] [address]        Equal Access to " Services     [facility]: Hadii aad ku hadbetofredo Nicolas, waandrada luqadaha, qaybta kaalmaidris ordoñez, victoriano schwab . So [facility] [phone].    ATENCIÓN: Si habla español, tiene a latif disposición servicios gratuitos de asistencia lingüística. Llame al [phone].    We comply with applicable federal civil rights laws and [place] laws. We do not discriminate on the basis of race, color, national origin, age, disability, sex, sexual orientation, or gender identity.            Thank you!     Thank you for choosing [facility]  for your care. Our goal is always to provide you with excellent care. Hearing back from our patients is one way we can continue to improve our services. Please take a few minutes to complete the written survey that you may receive in the mail after your visit with us. Thank you!             Your Updated Medication List - Protect others around you: Learn how to safely use, store and throw away your medicines at www.disposemymeds.org.          This list is accurate as of 11/21/18 10:16 AM.  Always use your most recent med list.                   Brand Name Dispense Instructions for use Diagnosis    aspirin 81 MG tablet      Take 81 mg by mouth daily with food        CALCIUM 600 + D PO      Take by mouth 2 times daily        ICAPS MV PO      Take 4 capsules by mouth daily        lovastatin 20 MG tablet    MEVACOR    90 tablet    Take 1 tablet (20 mg) by mouth At Bedtime    Pure hypercholesterolemia

## 2018-11-21 NOTE — PROGRESS NOTES
SUBJECTIVE:   Diane Ricardo is a 73 year old female who presents to clinic today for the following health issues:      Pre-Diabetes Follow-up      Patient is checking blood sugars: not at all    Pre-Diabetic concerns: None     Symptoms of hypoglycemia (low blood sugar): none     Paresthesias (numbness or burning in feet) or sores: No    Diabetes Management Resources    Hyperlipidemia Follow-Up      Rate your low fat/cholesterol diet?: good    Taking statin?  Yes, no muscle aches from statin    Other lipid medications/supplements?:  none    History of Elevated Blood Pressures      Outpatient blood pressures are not being checked routinely/consistently.    Low Salt Diet: not monitoring salt    BP Readings from Last 2 Encounters:   11/21/18 122/82   11/10/18 134/63     LDL Cholesterol Calculated (mg/dL)   Date Value   11/22/2017 95   11/22/2016 102 (H)       Amount of exercise or physical activity: None    Problems taking medications regularly: No    Medication side effects: none    Diet: regular (no restrictions)      Wt Readings from Last 5 Encounters:   11/21/18 211 lb (95.7 kg)   11/10/18 218 lb 3.2 oz (99 kg)   11/22/17 208 lb 3 oz (94.4 kg)   11/22/16 211 lb (95.7 kg)   03/09/16 207 lb (93.9 kg)         Post-menopause for about 20 years. Denies post menopausal bleeding.    Denies breast concerns. Mammogram scheduled for next week.    Has never had colonoscopy.    Immunizations up to date.        ED/Hospital Followup:    Facility:  [facility]  Date of visit: 11/09/2018  Reason for visit: Left UPJ Stone    Hospital discharge summary, labs, imaging reviewed.       Current Status: Patient has not had recurrent symptoms. She does have some urinary frequency and is uncertain if it is due to increased fluid intake. She reports that while in the hospital and at home her urine was being strained and no stone was ever recovered. She feels concerned about this as she is uncertain if stone could still be present. Denies  fever, chills, body aches, flank pain, hematuria, dysuria, urinary urgency.               Problem list and histories reviewed & adjusted, as indicated.  Additional history: as documented    Patient Active Problem List   Diagnosis     ACP (advance care planning)     Elevated blood pressure reading without diagnosis of hypertension     Hyperlipidemia     Prediabetes     Ureteral stone     Past Surgical History:   Procedure Laterality Date     LAPAROSCOPIC CHOLECYSTECTOMY      1997,Laparoscopic cholecystectomy     TONSILLECTOMY, ADENOIDECTOMY, COMBINED      as a child       Social History   Substance Use Topics     Smoking status: Former Smoker     Packs/day: 0.40     Years: 22.00     Types: Cigarettes     Quit date: 1/1/1986     Smokeless tobacco: Never Used     Alcohol use No      Comment: Alcoholic Drinks/day: occasionally     Family History   Problem Relation Age of Onset     Cancer Mother      Cancer,Spinal Cancer     Cancer Maternal Aunt      Cancer,Hodgkin's lymphoma     Prostate Cancer Other      Cancer-prostate     Cancer Other      Cancer,thyroid cancer     Unknown/Adopted Father      Unknown     Myocardial Infarction Maternal Grandmother 74     Heart attack     Asthma Maternal Grandfather      Asthma     Other - See Comments Son      No Known Problems     Breast Cancer No family hx of      Cancer-breast         Current Outpatient Prescriptions   Medication Sig Dispense Refill     lovastatin (MEVACOR) 20 MG tablet Take 20 mg by mouth At Bedtime       aspirin 81 MG tablet Take 81 mg by mouth daily with food       Calcium Carb-Cholecalciferol (CALCIUM 600 + D PO) Take by mouth 2 times daily       Multiple Vitamins-Minerals (ICAPS MV PO) Take 4 capsules by mouth daily       No Known Allergies  Recent Labs   Lab Test  11/10/18   0542  11/09/18   1030  11/22/17   1136  11/22/16   1157  03/07/16   0942   LDL   --    --   95  102*  98   HDL   --    --   54  48  50   TRIG   --    --   116  123  112   ALT   --   12    "--    --    --    CR  0.81  0.90  0.75  0.78  0.92   GFRESTIMATED  69  61   --    --    --    GFRESTBLACK  84  74  >60  >60  >60   POTASSIUM  3.5  4.1  4.3  4.2  4.6      BP Readings from Last 3 Encounters:   11/21/18 122/82   11/10/18 134/63   11/22/17 170/76    Wt Readings from Last 3 Encounters:   11/21/18 211 lb (95.7 kg)   11/10/18 218 lb 3.2 oz (99 kg)   11/22/17 208 lb 3 oz (94.4 kg)                    Reviewed and updated as needed this visit by clinical staff  Tobacco  Allergies  Meds  Med Hx  Surg Hx  Fam Hx  Soc Hx      Reviewed and updated as needed this visit by Provider         ROS:    Constitutional, HEENT, cardiovascular, pulmonary, gi and gu systems are negative, except as otherwise noted.    OBJECTIVE:     /82 (BP Location: Right arm, Patient Position: Sitting, Cuff Size: Adult Large)  Pulse 64  Ht 5' 0.5\" (1.537 m)  Wt 211 lb (95.7 kg)  Breastfeeding? No  BMI 40.53 kg/m2  Body mass index is 40.53 kg/(m^2).     GENERAL: healthy, alert and no distress  EYES: Eyes grossly normal to inspection, glasses  HENT: normal cephalic/atraumatic, right ear: cerumen impaction, left ear: normal: no effusions, no erythema, normal landmarks, nose and mouth without ulcers or lesions, oropharynx clear and oral mucous membranes moist  NECK: no adenopathy, no asymmetry, masses, or scars  RESP: lungs clear to auscultation - no rales, rhonchi or wheezes  CV: regular rate and rhythm, normal S1 S2, no S3 or S4, no murmur, click or rub, no peripheral edema and peripheral pulses strong  NEURO: Normal strength and tone, mentation intact and speech normal  PSYCH: mentation appears normal, affect normal/bright      Diagnostic Test Results:  Results for orders placed or performed in visit on 11/21/18 (from the past 24 hour(s))   Hemoglobin A1c   Result Value Ref Range    Hemoglobin A1C 5.2 4.0 - 6.0 %   Lipid Panel   Result Value Ref Range    Cholesterol 148 <200 mg/dL    Triglycerides 106 <150 mg/dL    HDL " Cholesterol 49 23 - 92 mg/dL    LDL Cholesterol Calculated 78 <100 mg/dL    Non HDL Cholesterol 99 <130 mg/dL   Urinalysis w Reflex Microscopic If Positive   Result Value Ref Range    Color Urine Yellow     Appearance Urine Clear     Glucose Urine Negative NEG^Negative mg/dL    Bilirubin Urine Small (A) NEG^Negative    Ketones Urine 15 (A) NEG^Negative mg/dL    Specific Gravity Urine 1.025 1.000 - 1.030    Blood Urine Negative NEG^Negative    pH Urine 5.5 5.0 - 9.0 pH    Protein Albumin Urine Negative NEG^Negative mg/dL    Urobilinogen Urine 0.2 0.2 - 1.0 EU/dL    Nitrite Urine Negative NEG^Negative    Leukocyte Esterase Urine Trace (A) NEG^Negative    Source Midstream Urine    Urine Microscopic   Result Value Ref Range    WBC Urine 0 - 5 OTO5^0 - 5 /HPF    RBC Urine O - 2 OTO2^O - 2 /HPF    Bacteria Urine Few (A) NEG^Negative /HPF    Mucous Urine Present (A) NEG^Negative /LPF       ASSESSMENT/PLAN:     1. Pure hypercholesterolemia  Chronic.   - Will re-check labs today. Continue with daily Aspirin 81 mg.  - Refilled: lovastatin (MEVACOR) 20 MG tablet; Take 1 tablet (20 mg) by mouth At Bedtime  Dispense: 90 tablet; Refill: 4  - Lipid Panel; Future    2. Prediabetes  A1c last year was 5.0. Will re-check this year  - Continue with lifestyle modifications for weight and pre-diabetes including at least 30 minutes physical activity such as brisk walking at least 5 days per week, heart healthy diet, sugar/sweets intake in moderation.  - Hemoglobin A1c; Future    3. Urinary frequency  Follow-up from ED. Will obtain UA.   - Urinalysis w Reflex Microscopic If Positive    4. Impacted cerumen of right ear  Acute  - Ear irrigation and impaction resolved. Tolerated without complications and able to hear after irrigation.    5. History of renal calculi  Discussed with patient that given resolution of symptoms stone has passed or else symptoms would persist. Education provided that given history of kidney stones she is more likely  to have recurrence. Encouraged her to continue with fluid intake as she thinks prior to kidney stone she was not good about drinking water/staying hydrated.            FUTURE APPOINTMENTS:       - Follow-up visit: 1 year with PCP for routine visit.  Sooner for acute concerns.      Saima Engle [facility]

## 2018-11-21 NOTE — LETTER
November 21, 2018      Diane Ricardo  [address]  [facility] [address]        Dear ,    We are writing to inform you of your test results.    Your lipid panel is normal. Continue with lovastatin. Also recommend following a heart healthy diet and 30 minutes of physical activity such as a brisk was at least 5 days per week. The American Heart Association is a great resource for information on a Heart Healthy diet.    Your hemoglobin A1c is 5.2. This is slightly increased from last year but still normal. Continue with watching sugar/sweet intake.    Your urine is concentrated and the ketones could be due to needing to increase fluid intake. Otherwise, there is no blood or sign of infection.     Resulted Orders   Urinalysis w Reflex Microscopic If Positive   Result Value Ref Range    Color Urine Yellow     Appearance Urine Clear     Glucose Urine Negative NEG^Negative mg/dL    Bilirubin Urine Small (A) NEG^Negative      Comment:      This is an unconfirmed screening test result. A positive result may be false.    Ketones Urine 15 (A) NEG^Negative mg/dL    Specific Gravity Urine 1.025 1.000 - 1.030    Blood Urine Negative NEG^Negative    pH Urine 5.5 5.0 - 9.0 pH    Protein Albumin Urine Negative NEG^Negative mg/dL    Urobilinogen Urine 0.2 0.2 - 1.0 EU/dL    Nitrite Urine Negative NEG^Negative    Leukocyte Esterase Urine Trace (A) NEG^Negative    Source Midstream Urine    Hemoglobin A1c   Result Value Ref Range    Hemoglobin A1C 5.2 4.0 - 6.0 %   Lipid Panel   Result Value Ref Range    Cholesterol 148 <200 mg/dL    Triglycerides 106 <150 mg/dL    HDL Cholesterol 49 23 - 92 mg/dL    LDL Cholesterol Calculated 78 <100 mg/dL      Comment:      Desirable:       <100 mg/dl    Non HDL Cholesterol 99 <130 mg/dL   Urine Microscopic   Result Value Ref Range    WBC Urine 0 - 5 OTO5^0 - 5 /HPF    RBC Urine O - 2 OTO2^O - 2 /HPF    Bacteria Urine Few (A) NEG^Negative /HPF    Mucous Urine Present (A)  NEG^Negative /LPF       If you have any questions or concerns, please call the clinic at the number listed above.       Sincerely,        Saima Engle, CNP

## 2018-11-22 ASSESSMENT — ANXIETY QUESTIONNAIRES: GAD7 TOTAL SCORE: 0

## 2018-11-30 ENCOUNTER — HOSPITAL ENCOUNTER (OUTPATIENT)
Dept: MAMMOGRAPHY | Facility: OTHER | Age: 73
Discharge: HOME | End: 2018-11-30
Attending: NURSE PRACTITIONER | Admitting: NURSE PRACTITIONER
Payer: COMMERCIAL

## 2018-11-30 DIAGNOSIS — Z12.31 VISIT FOR SCREENING MAMMOGRAM: ICD-10-CM

## 2018-11-30 PROCEDURE — 77063 BREAST TOMOSYNTHESIS BI: CPT

## 2019-01-28 ENCOUNTER — APPOINTMENT (OUTPATIENT)
Dept: CT IMAGING | Facility: OTHER | Age: 74
DRG: 661 | End: 2019-01-28
Payer: COMMERCIAL

## 2019-01-28 ENCOUNTER — HOSPITAL ENCOUNTER (INPATIENT)
Facility: OTHER | Age: 74
LOS: 1 days | Discharge: HOME | DRG: 661 | End: 2019-01-29
Attending: FAMILY MEDICINE | Admitting: FAMILY MEDICINE
Payer: COMMERCIAL

## 2019-01-28 DIAGNOSIS — N20.1 URETERAL STONE: Primary | ICD-10-CM

## 2019-01-28 DIAGNOSIS — N20.0 KIDNEY STONES: ICD-10-CM

## 2019-01-28 DIAGNOSIS — E78.5 HYPERLIPIDEMIA, UNSPECIFIED HYPERLIPIDEMIA TYPE: ICD-10-CM

## 2019-01-28 DIAGNOSIS — N20.1 CALCULUS OF DISTAL LEFT URETER: ICD-10-CM

## 2019-01-28 DIAGNOSIS — Z87.891 PERSONAL HISTORY OF TOBACCO USE, PRESENTING HAZARDS TO HEALTH: ICD-10-CM

## 2019-01-28 PROBLEM — R03.0 ELEVATED BLOOD PRESSURE READING WITHOUT DIAGNOSIS OF HYPERTENSION: Status: ACTIVE | Noted: 2018-02-26

## 2019-01-28 LAB
ALBUMIN UR-MCNC: NEGATIVE MG/DL
ANION GAP SERPL CALCULATED.3IONS-SCNC: 11 MMOL/L (ref 3–14)
APPEARANCE UR: CLEAR
BACTERIA #/AREA URNS HPF: ABNORMAL /HPF
BASOPHILS # BLD AUTO: 0 10E9/L (ref 0–0.2)
BASOPHILS NFR BLD AUTO: 0.3 %
BILIRUB UR QL STRIP: ABNORMAL
BUN SERPL-MCNC: 22 MG/DL (ref 7–25)
CALCIUM SERPL-MCNC: 9 MG/DL (ref 8.6–10.3)
CHLORIDE SERPL-SCNC: 107 MMOL/L (ref 98–107)
CO2 SERPL-SCNC: 21 MMOL/L (ref 21–31)
COLOR UR AUTO: YELLOW
CREAT SERPL-MCNC: 0.96 MG/DL (ref 0.6–1.2)
DIFFERENTIAL METHOD BLD: ABNORMAL
EOSINOPHIL # BLD AUTO: 0 10E9/L (ref 0–0.7)
EOSINOPHIL NFR BLD AUTO: 0.3 %
ERYTHROCYTE [DISTWIDTH] IN BLOOD BY AUTOMATED COUNT: 13.8 % (ref 10–15)
GFR SERPL CREATININE-BSD FRML MDRD: 57 ML/MIN/{1.73_M2}
GLUCOSE SERPL-MCNC: 136 MG/DL (ref 70–105)
GLUCOSE UR STRIP-MCNC: NEGATIVE MG/DL
HCT VFR BLD AUTO: 40 % (ref 35–47)
HGB BLD-MCNC: 13.2 G/DL (ref 11.7–15.7)
HGB UR QL STRIP: ABNORMAL
IMM GRANULOCYTES # BLD: 0.1 10E9/L (ref 0–0.4)
IMM GRANULOCYTES NFR BLD: 0.5 %
KETONES UR STRIP-MCNC: ABNORMAL MG/DL
LEUKOCYTE ESTERASE UR QL STRIP: NEGATIVE
LYMPHOCYTES # BLD AUTO: 1.2 10E9/L (ref 0.8–5.3)
LYMPHOCYTES NFR BLD AUTO: 8.3 %
MCH RBC QN AUTO: 29.5 PG (ref 26.5–33)
MCHC RBC AUTO-ENTMCNC: 33 G/DL (ref 31.5–36.5)
MCV RBC AUTO: 90 FL (ref 78–100)
MONOCYTES # BLD AUTO: 0.8 10E9/L (ref 0–1.3)
MONOCYTES NFR BLD AUTO: 6 %
NEUTROPHILS # BLD AUTO: 11.8 10E9/L (ref 1.6–8.3)
NEUTROPHILS NFR BLD AUTO: 84.6 %
NITRATE UR QL: NEGATIVE
NON-SQ EPI CELLS #/AREA URNS LPF: ABNORMAL /LPF
PH UR STRIP: 5.5 PH (ref 5–9)
PLATELET # BLD AUTO: 229 10E9/L (ref 150–450)
POTASSIUM SERPL-SCNC: 3.9 MMOL/L (ref 3.5–5.1)
RBC # BLD AUTO: 4.47 10E12/L (ref 3.8–5.2)
RBC #/AREA URNS AUTO: ABNORMAL /HPF
SODIUM SERPL-SCNC: 139 MMOL/L (ref 134–144)
SOURCE: ABNORMAL
SP GR UR STRIP: >1.03 (ref 1–1.03)
UROBILINOGEN UR STRIP-ACNC: 1 EU/DL (ref 0.2–1)
WBC # BLD AUTO: 13.9 10E9/L (ref 4–11)
WBC #/AREA URNS AUTO: ABNORMAL /HPF

## 2019-01-28 PROCEDURE — 74176 CT ABD & PELVIS W/O CONTRAST: CPT

## 2019-01-28 PROCEDURE — 99222 1ST HOSP IP/OBS MODERATE 55: CPT | Mod: AI | Performed by: FAMILY MEDICINE

## 2019-01-28 PROCEDURE — 96361 HYDRATE IV INFUSION ADD-ON: CPT | Performed by: FAMILY MEDICINE

## 2019-01-28 PROCEDURE — 85025 COMPLETE CBC W/AUTO DIFF WBC: CPT | Performed by: EMERGENCY MEDICINE

## 2019-01-28 PROCEDURE — 12000000 ZZH R&B MED SURG/OB

## 2019-01-28 PROCEDURE — 99223 1ST HOSP IP/OBS HIGH 75: CPT | Mod: 25 | Performed by: UROLOGY

## 2019-01-28 PROCEDURE — 99285 EMERGENCY DEPT VISIT HI MDM: CPT | Mod: 25 | Performed by: FAMILY MEDICINE

## 2019-01-28 PROCEDURE — 80048 BASIC METABOLIC PNL TOTAL CA: CPT | Performed by: EMERGENCY MEDICINE

## 2019-01-28 PROCEDURE — 93010 ELECTROCARDIOGRAM REPORT: CPT | Performed by: INTERNAL MEDICINE

## 2019-01-28 PROCEDURE — 25000132 ZZH RX MED GY IP 250 OP 250 PS 637: Performed by: EMERGENCY MEDICINE

## 2019-01-28 PROCEDURE — 99285 EMERGENCY DEPT VISIT HI MDM: CPT | Mod: Z6 | Performed by: FAMILY MEDICINE

## 2019-01-28 PROCEDURE — 25000128 H RX IP 250 OP 636: Performed by: FAMILY MEDICINE

## 2019-01-28 PROCEDURE — 25000132 ZZH RX MED GY IP 250 OP 250 PS 637: Performed by: FAMILY MEDICINE

## 2019-01-28 PROCEDURE — 25000128 H RX IP 250 OP 636: Performed by: EMERGENCY MEDICINE

## 2019-01-28 PROCEDURE — 36415 COLL VENOUS BLD VENIPUNCTURE: CPT | Performed by: EMERGENCY MEDICINE

## 2019-01-28 PROCEDURE — 87086 URINE CULTURE/COLONY COUNT: CPT | Performed by: FAMILY MEDICINE

## 2019-01-28 PROCEDURE — 93005 ELECTROCARDIOGRAM TRACING: CPT

## 2019-01-28 PROCEDURE — 96374 THER/PROPH/DIAG INJ IV PUSH: CPT | Performed by: FAMILY MEDICINE

## 2019-01-28 PROCEDURE — 81001 URINALYSIS AUTO W/SCOPE: CPT | Performed by: EMERGENCY MEDICINE

## 2019-01-28 RX ORDER — OXYCODONE AND ACETAMINOPHEN 5; 325 MG/1; MG/1
1-2 TABLET ORAL EVERY 4 HOURS PRN
Status: CANCELLED | OUTPATIENT
Start: 2019-01-28

## 2019-01-28 RX ORDER — ACETAMINOPHEN 325 MG/1
650 TABLET ORAL EVERY 4 HOURS PRN
Status: DISCONTINUED | OUTPATIENT
Start: 2019-01-28 | End: 2019-01-29 | Stop reason: HOSPADM

## 2019-01-28 RX ORDER — AMOXICILLIN 250 MG
1 CAPSULE ORAL 2 TIMES DAILY PRN
Status: DISCONTINUED | OUTPATIENT
Start: 2019-01-28 | End: 2019-01-29 | Stop reason: HOSPADM

## 2019-01-28 RX ORDER — FENTANYL CITRATE 50 UG/ML
50 INJECTION, SOLUTION INTRAMUSCULAR; INTRAVENOUS
Status: DISCONTINUED | OUTPATIENT
Start: 2019-01-28 | End: 2019-01-29 | Stop reason: HOSPADM

## 2019-01-28 RX ORDER — ONDANSETRON 2 MG/ML
4 INJECTION INTRAMUSCULAR; INTRAVENOUS EVERY 6 HOURS PRN
Status: DISCONTINUED | OUTPATIENT
Start: 2019-01-28 | End: 2019-01-29 | Stop reason: HOSPADM

## 2019-01-28 RX ORDER — SODIUM CHLORIDE 9 MG/ML
INJECTION, SOLUTION INTRAVENOUS CONTINUOUS
Status: DISCONTINUED | OUTPATIENT
Start: 2019-01-28 | End: 2019-01-28

## 2019-01-28 RX ORDER — HYDROCODONE BITARTRATE AND ACETAMINOPHEN 5; 325 MG/1; MG/1
1-2 TABLET ORAL EVERY 4 HOURS PRN
Status: DISCONTINUED | OUTPATIENT
Start: 2019-01-28 | End: 2019-01-29 | Stop reason: HOSPADM

## 2019-01-28 RX ORDER — HYDROMORPHONE HYDROCHLORIDE 1 MG/ML
0.2 INJECTION, SOLUTION INTRAMUSCULAR; INTRAVENOUS; SUBCUTANEOUS
Status: DISCONTINUED | OUTPATIENT
Start: 2019-01-28 | End: 2019-01-29 | Stop reason: HOSPADM

## 2019-01-28 RX ORDER — ACETAMINOPHEN 325 MG/1
650 TABLET ORAL ONCE
Status: COMPLETED | OUTPATIENT
Start: 2019-01-28 | End: 2019-01-28

## 2019-01-28 RX ORDER — AMOXICILLIN 250 MG
2 CAPSULE ORAL 2 TIMES DAILY PRN
Status: DISCONTINUED | OUTPATIENT
Start: 2019-01-28 | End: 2019-01-29 | Stop reason: HOSPADM

## 2019-01-28 RX ORDER — NALOXONE HYDROCHLORIDE 0.4 MG/ML
.1-.4 INJECTION, SOLUTION INTRAMUSCULAR; INTRAVENOUS; SUBCUTANEOUS
Status: DISCONTINUED | OUTPATIENT
Start: 2019-01-28 | End: 2019-01-29 | Stop reason: HOSPADM

## 2019-01-28 RX ORDER — HYDROMORPHONE HYDROCHLORIDE 1 MG/ML
0.5 INJECTION, SOLUTION INTRAMUSCULAR; INTRAVENOUS; SUBCUTANEOUS ONCE
Status: DISCONTINUED | OUTPATIENT
Start: 2019-01-28 | End: 2019-01-28

## 2019-01-28 RX ORDER — DOCUSATE SODIUM 100 MG/1
100 CAPSULE, LIQUID FILLED ORAL 2 TIMES DAILY
Status: DISCONTINUED | OUTPATIENT
Start: 2019-01-28 | End: 2019-01-29 | Stop reason: HOSPADM

## 2019-01-28 RX ORDER — ONDANSETRON 4 MG/1
4 TABLET, ORALLY DISINTEGRATING ORAL EVERY 6 HOURS PRN
Status: DISCONTINUED | OUTPATIENT
Start: 2019-01-28 | End: 2019-01-29 | Stop reason: HOSPADM

## 2019-01-28 RX ADMIN — ACETAMINOPHEN 650 MG: 325 TABLET, FILM COATED ORAL at 23:43

## 2019-01-28 RX ADMIN — SODIUM CHLORIDE, SODIUM LACTATE, POTASSIUM CHLORIDE, AND CALCIUM CHLORIDE 500 ML: 600; 310; 30; 20 INJECTION, SOLUTION INTRAVENOUS at 09:35

## 2019-01-28 RX ADMIN — ACETAMINOPHEN 650 MG: 325 TABLET, FILM COATED ORAL at 06:32

## 2019-01-28 RX ADMIN — IBUPROFEN 600 MG: 200 TABLET, FILM COATED ORAL at 06:33

## 2019-01-28 RX ADMIN — FENTANYL CITRATE 50 MCG: 50 INJECTION, SOLUTION INTRAMUSCULAR; INTRAVENOUS at 10:40

## 2019-01-28 RX ADMIN — SODIUM CHLORIDE, SODIUM LACTATE, POTASSIUM CHLORIDE, AND CALCIUM CHLORIDE 500 ML: 600; 310; 30; 20 INJECTION, SOLUTION INTRAVENOUS at 07:03

## 2019-01-28 ASSESSMENT — ACTIVITIES OF DAILY LIVING (ADL)
AMBULATION: 0-->INDEPENDENT
ADLS_ACUITY_SCORE: 10
RETIRED_COMMUNICATION: 0-->UNDERSTANDS/COMMUNICATES WITHOUT DIFFICULTY
FALL_HISTORY_WITHIN_LAST_SIX_MONTHS: NO
SWALLOWING: 0-->SWALLOWS FOODS/LIQUIDS WITHOUT DIFFICULTY
TOILETING: 0-->INDEPENDENT
TRANSFERRING: 0-->INDEPENDENT
BATHING: 0-->INDEPENDENT
RETIRED_EATING: 0-->INDEPENDENT
COGNITION: 0 - NO COGNITION ISSUES REPORTED
DRESS: 0-->INDEPENDENT

## 2019-01-28 ASSESSMENT — ENCOUNTER SYMPTOMS
MUSCULOSKELETAL NEGATIVE: 1
CHEST TIGHTNESS: 0
ENDOCRINE NEGATIVE: 1
ABDOMINAL PAIN: 1
DIFFICULTY URINATING: 1
DIARRHEA: 0
CONSTITUTIONAL NEGATIVE: 1
FATIGUE: 0
PHOTOPHOBIA: 0
EYES NEGATIVE: 1
NAUSEA: 1

## 2019-01-28 ASSESSMENT — MIFFLIN-ST. JEOR: SCORE: 1457.53

## 2019-01-28 NOTE — ED NOTES
"Patient up to void, states she has burning with voiding and an \"open feeling\". Litzy Harp RN on 1/28/2019 at 10:00 AM    "

## 2019-01-28 NOTE — ED NOTES
Copies of medical records from [place] sent to urology for review. Litzy Harp RN on 1/28/2019 at 9:23 AM

## 2019-01-28 NOTE — PHARMACY-ADMISSION MEDICATION HISTORY
Pharmacy -- Admission Medication Reconciliation    Prior to admission (PTA) medications were reviewed and the patient's PTA medication list was updated.    Sources Consulted: patient, [facility] (waiting on med list)    The reliability of this Medication Reconciliation is: Reliability: Reliable    The following significant changes were made:  none    In addition, the patient's allergies were reviewed with the patient and updated as follows:   Allergies: Patient has no known allergies.    The pharmacist has reviewed with the patient that all personal medications should be removed from the building or locked in the belongings safe.  Patient shall only take medications ordered by the physician and administered by the nursing staff.       Medication barriers identified: none   Medication adherence concerns: none   Understanding of emergency medications: n/a    Destinee Guillermo [facility], 1/28/2019,  12:02 PM

## 2019-01-28 NOTE — H&P
[facility]    History and Physical - Hospitalist Service       Date of Admission:  1/28/2019    Assessment & Plan   Diane Ricardo is a 73 year old female admitted on 1/28/2019. She developed acute left flank pain at 130 last night.  Found to have obstructing distal left ureteral calculus 6 mm in diameter.  She required IV fentanyl to control her pain.  She will be admitted for pain management and urologic consultation management.    Active Problems:    Elevated blood pressure reading without diagnosis of hypertension    Assessment: Borderline systolic elevation in the setting of pain.    Plan: We will monitor    Hyperlipidemia    Assessment: Last lipid profile was excellent.    Plan: Continue lovastatin    Prediabetes    Assessment: Last A1c was excellent at 5.2.    Plan: Continue outpatient monitoring.    Renal lithiasis    Assessment: Pain control with IV Dilaudid or oral hydrocodone.    Plan: Definitive urologic management.  Appreciate Dr. Yepez's expertise.    I discussed with patient about CODE STATUS.  She would want trial of CPR  including defibrillation but would not want intubation or mechanical breathing.  She would not want any feeding tubes or long-term life sustaining measures and would instead choose comfort if that situation were to arise.       Diet: NPO per Anesthesia Guidelines for Procedure/Surgery Except for: Meds  Combination Diet Regular Diet Adult    DVT Prophylaxis: Pneumatic Compression Devices  Cárdenas Catheter: not present  Code Status: Full Code      Disposition Plan   Expected discharge: Tomorrow, recommended to prior living arrangement once pain under control and urologic intervention complete.  Entered: Noah Mendez MD 01/28/2019, 12:44 PM     The patient's care was discussed with the Patient.    Noah Mendez MD  [facility]    ______________________________________________________________________    Chief Complaint   Left flank  pain    History is obtained from the patient    History of Present Illness   Diane Ricardo is a 73 year old female who has known left ureteral lithiasis diagnosed 3 weeks ago at outside facility who developed acute left flank pain at 1:30 in the morning.  She reports the pain came on suddenly and has been severe.  Described as colicky in nature.  Currently at about the midpoint of her left side.  She tells me that she felt a little bit chilled and the pain came on but this has not been persistent.  No fevers.  Has not had any dysuria.  She has not noticed any blood in her urine but it was a little bit more concentrated yesterday.    Patient reports otherwise she has had no health issues.  She is on lovastatin for high cholesterol but has not had chest pain  or any anginal equivalents.  She has not had shortness of breath cough or other pulmonary symptoms.    She has been under quite a bit of stress lately as her  was recently diagnosed with lung cancer metastatic to the brain.    Review of Systems    CONSTITUTIONAL: NEGATIVE for fever, chills, change in weight  INTEGUMENTARY/SKIN: NEGATIVE for worrisome rashes, moles or lesions  EYES: NEGATIVE for vision changes or irritation  ENT/MOUTH: NEGATIVE for ear, mouth and throat problems  RESP: NEGATIVE for significant cough or SOB  BREAST: NEGATIVE for masses, tenderness or discharge  CV: NEGATIVE for chest pain, palpitations or peripheral edema  GI: See HPI  : NEGATIVE for frequency, dysuria, or hematuria  MUSCULOSKELETAL: NEGATIVE for significant arthralgias or myalgia  NEURO: NEGATIVE for weakness, dizziness or paresthesias  ENDOCRINE: NEGATIVE for temperature intolerance, skin/hair changes  HEME: NEGATIVE for bleeding problems  PSYCHIATRIC: NEGATIVE for changes in mood or affect    Past Medical History    I have reviewed this patient's medical history and updated it with pertinent information if needed.   Past Medical History:   Diagnosis Date     Elevated  blood pressure reading without diagnosis of hypertension     Elevated in clinic, monitors at home with normal values.     Hyperlipidemia     2011     Prediabetes     2015       Past Surgical History   I have reviewed this patient's surgical history and updated it with pertinent information if needed.  Past Surgical History:   Procedure Laterality Date     LAPAROSCOPIC CHOLECYSTECTOMY      ,Laparoscopic cholecystectomy     TONSILLECTOMY, ADENOIDECTOMY, COMBINED      as a child       Social History   I have reviewed this patient's social history and updated it with pertinent information if needed.  Social History     Tobacco Use     Smoking status: Former Smoker     Packs/day: 0.40     Years: 22.00     Pack years: 8.80     Types: Cigarettes     Last attempt to quit: 1986     Years since quittin.0     Smokeless tobacco: Never Used   Substance Use Topics     Alcohol use: No     Alcohol/week: 0.5 oz     Comment: Alcoholic Drinks/day: occasionally     Drug use: No     Comment: Drug use: No       Family History   I have reviewed this patient's family history and updated it with pertinent information if needed.   Family History   Problem Relation Age of Onset     Cancer Mother         Cancer,Spinal Cancer     Prostate Cancer Other         Cancer-prostate     Cancer Other         Cancer,thyroid cancer     Unknown/Adopted Father         Unknown     Myocardial Infarction Maternal Grandmother 74        Heart attack     Asthma Maternal Grandfather         Asthma     Cancer Maternal Aunt         Cancer,Hodgkin's lymphoma     Other - See Comments Son         No Known Problems     Breast Cancer No family hx of         Cancer-breast       Prior to Admission Medications   Prior to Admission Medications   Prescriptions Last Dose Informant Patient Reported? Taking?   Calcium Carb-Cholecalciferol (CALCIUM 600 + D PO) 2019 at AM Self Yes Yes   Sig: Take 1 tablet by mouth 2 times daily    Multiple  Vitamins-Minerals (ICAPS MV PO) 1/27/2019 at AM Self Yes Yes   Sig: Take 1 capsule by mouth 4 times daily    aspirin 81 MG tablet Past Week at HAS NOT TAKEN FOR A FEW DAYS Self Yes Yes   Sig: Take 81 mg by mouth daily with food   lovastatin (MEVACOR) 20 MG tablet 1/27/2019 at PM Self No Yes   Sig: Take 1 tablet (20 mg) by mouth At Bedtime      Facility-Administered Medications: None     Allergies   No Known Allergies    Physical Exam   Vital Signs: Temp: 98.5  F (36.9  C) Temp src: Tympanic BP: 137/57 Pulse: 68   Resp: 16 SpO2: 96 % O2 Device: None (Room air)    Weight: 223 lbs 12.8 oz    Constitutional: awake, alert, cooperative, no apparent distress, and appears stated age  Eyes: Lids and lashes normal, pupils equal, round and reactive to light, extra ocular muscles intact, sclera clear, conjunctiva normal  ENT: Normocephalic, without obvious abnormality, atraumatic, sinuses nontender on palpation, external ears without lesions, oral pharynx with moist mucous membranes, tonsils without erythema or exudates, gums normal and good dentition.  Respiratory: No increased work of breathing, good air exchange, clear to auscultation bilaterally, no crackles or wheezing  Cardiovascular: Normal apical impulse, regular rate and rhythm, normal S1 and S2, no S3 or S4, and no murmur noted  GI: No scars, normal bowel sounds, soft, non-distended, non-tender, no masses palpated, no hepatosplenomegally  Skin: no bruising or bleeding and normal skin color, texture, turgor  Musculoskeletal: There is no redness, warmth, or swelling of the joints.  Full range of motion noted.  Motor strength is 5 out of 5 all extremities bilaterally.  Tone is normal.  Currently NO CVA tenderness   Neurologic: Awake, alert, oriented to name, place and time.  Cranial nerves II-XII are grossly intact.  Motor is 5 out of 5 bilaterally.  Cerebellar finger to nose, heel to shin intact.  Sensory is intact.  Babinski down going, Romberg negative, and gait is  normal.  Neuropsychiatric: General: normal, calm and normal eye contact  Affect: normal  Memory and insight: normal, memory for past and recent events intact and thought process normal    Data   Data reviewed today: I reviewed all medications, new labs and imaging results over the last 24 hours. I personally reviewed the CT abd report.    Recent Labs   Lab 01/28/19  0645   WBC 13.9*   HGB 13.2   MCV 90         POTASSIUM 3.9   CHLORIDE 107   CO2 21   BUN 22   CR 0.96   ANIONGAP 11   JONN 9.0   *

## 2019-01-28 NOTE — ED PROVIDER NOTES
History     Chief Complaint   Patient presents with     Abdominal Pain     HPI  Diane Ricardo is a 73 year old female with past medical history of prediabetes, hypertension, and hyperlipidemia who presents for evaluation of ongoing left flank pain in the setting of recently known ureterolithiasis.  She had a known stone that appears to have passed in November with resolution of symptoms.  She then went to [place] and recurrent had recurrence of symptoms with any other discovered stone on CT scan.  She subsequently had recurrence of symptoms and was found to have bilateral mild hydronephrosis and was supposed to have a IV pyelogram under fluoroscopy in [place] but had to fly back urgently due to a malignancy diagnosed in her .  Her symptoms are ongoing, and she reports left-sided flank pain radiating inferiorly associated with a fullness feeling and nausea.  She has otherwise been feeling in her normal state of health and denies any fevers, chills, chest pain, shortness of breath, dysuria, or rash.    Allergies:  No Known Allergies    Problem List:    Patient Active Problem List    Diagnosis Date Noted     Renal lithiasis 01/28/2019     Priority: Medium     Ureteral stone 11/09/2018     Priority: Medium     Elevated blood pressure reading without diagnosis of hypertension 02/26/2018     Priority: Medium     Overview:   Elevated in clinic, monitors at home with normal values.         Prediabetes 11/24/2015     Priority: Medium     ACP (advance care planning) 12/11/2013     Priority: Medium     Hyperlipidemia 11/21/2011     Priority: Medium        Past Medical History:    Past Medical History:   Diagnosis Date     Elevated blood pressure reading without diagnosis of hypertension      Hyperlipidemia      Prediabetes        Past Surgical History:    Past Surgical History:   Procedure Laterality Date     LAPAROSCOPIC CHOLECYSTECTOMY      1997,Laparoscopic cholecystectomy     TONSILLECTOMY, ADENOIDECTOMY,  COMBINED      as a child       Family History:    Family History   Problem Relation Age of Onset     Cancer Mother         Cancer,Spinal Cancer     Prostate Cancer Other         Cancer-prostate     Cancer Other         Cancer,thyroid cancer     Unknown/Adopted Father         Unknown     Myocardial Infarction Maternal Grandmother 74        Heart attack     Asthma Maternal Grandfather         Asthma     Cancer Maternal Aunt         Cancer,Hodgkin's lymphoma     Other - See Comments Son         No Known Problems     Breast Cancer No family hx of         Cancer-breast       Social History:  Marital Status:   [2]  Social History     Tobacco Use     Smoking status: Former Smoker     Packs/day: 0.40     Years: 22.00     Pack years: 8.80     Types: Cigarettes     Last attempt to quit: 1986     Years since quittin.0     Smokeless tobacco: Never Used   Substance Use Topics     Alcohol use: No     Alcohol/week: 0.5 oz     Comment: Alcoholic Drinks/day: occasionally     Drug use: No     Comment: Drug use: No        Medications:      aspirin 81 MG tablet   Calcium Carb-Cholecalciferol (CALCIUM 600 + D PO)   lovastatin (MEVACOR) 20 MG tablet   Multiple Vitamins-Minerals (ICAPS MV PO)         Review of Systems   Constitutional: Negative.  Negative for fatigue.   HENT: Negative.  Negative for congestion.    Eyes: Negative.  Negative for photophobia.   Respiratory: Negative for chest tightness.    Cardiovascular: Negative for chest pain.   Gastrointestinal: Positive for abdominal pain and nausea. Negative for diarrhea.   Endocrine: Negative.    Genitourinary: Positive for difficulty urinating.   Musculoskeletal: Negative.        Physical Exam   BP: 156/63  Pulse: 85  Temp: 98.5  F (36.9  C)  Resp: 20  Weight: 95.7 kg (211 lb)  SpO2: 97 %  Vitals: /60   Pulse 73   Temp 98.5  F (36.9  C) (Tympanic)   Resp 16   Wt 95.7 kg (211 lb)   SpO2 97%   Breastfeeding? No   BMI 40.53 kg/m    Constitutional: Patient  laying comfortably in cart in no apparent distress.  Neurological: Face symmetric, speech clear, gait normal, follows commands, moving all extremities.  Eyes: EOMI, sclera clear  ENT: Oropharynx without erythema or purulence, no tonsilar exudates, Mucus membranes moist.  Cardiovascular: Regular rate, regular rhythm, no murmurs, no rubs, no gallops. Extremities warm, well perfused.  Respiratory: lungs clear to auscultation bilaterally over anterior and posterior lung fields. No wheezes, rhonchi, or rales.  Gastrointestinal: Abdomen is soft, mildly tender to palpation over the left flank, nondistended, bowel sounds present, no rebound or guarding.   Genitourinary: There is mild bilateral CVA tenderness.  Musculoskeletal: Spontaneously moving all extremities, no visible bony defects.   Integumentary: Skin is warm and well perfused.  Psychiatric: Appropriate. Mood and affect congruent.      ED Course         Results for orders placed or performed during the hospital encounter of 01/28/19 (from the past 24 hour(s))   CBC with platelets differential   Result Value Ref Range    WBC 13.9 (H) 4.0 - 11.0 10e9/L    RBC Count 4.47 3.8 - 5.2 10e12/L    Hemoglobin 13.2 11.7 - 15.7 g/dL    Hematocrit 40.0 35.0 - 47.0 %    MCV 90 78 - 100 fl    MCH 29.5 26.5 - 33.0 pg    MCHC 33.0 31.5 - 36.5 g/dL    RDW 13.8 10.0 - 15.0 %    Platelet Count 229 150 - 450 10e9/L    Diff Method Automated Method     % Neutrophils 84.6 %    % Lymphocytes 8.3 %    % Monocytes 6.0 %    % Eosinophils 0.3 %    % Basophils 0.3 %    % Immature Granulocytes 0.5 %    Absolute Neutrophil 11.8 (H) 1.6 - 8.3 10e9/L    Absolute Lymphocytes 1.2 0.8 - 5.3 10e9/L    Absolute Monocytes 0.8 0.0 - 1.3 10e9/L    Absolute Eosinophils 0.0 0.0 - 0.7 10e9/L    Absolute Basophils 0.0 0.0 - 0.2 10e9/L    Abs Immature Granulocytes 0.1 0 - 0.4 10e9/L   Basic metabolic panel   Result Value Ref Range    Sodium 139 134 - 144 mmol/L    Potassium 3.9 3.5 - 5.1 mmol/L    Chloride 107  98 - 107 mmol/L    Carbon Dioxide 21 21 - 31 mmol/L    Anion Gap 11 3 - 14 mmol/L    Glucose 136 (H) 70 - 105 mg/dL    Urea Nitrogen 22 7 - 25 mg/dL    Creatinine 0.96 0.60 - 1.20 mg/dL    GFR Estimate 57 (L) >60 mL/min/[1.73_m2]    GFR Estimate If Black 69 >60 mL/min/[1.73_m2]    Calcium 9.0 8.6 - 10.3 mg/dL   UA reflex to Microscopic and Culture   Result Value Ref Range    Color Urine Yellow     Appearance Urine Clear     Glucose Urine Negative NEG^Negative mg/dL    Bilirubin Urine Small (A) NEG^Negative    Ketones Urine Trace (A) NEG^Negative mg/dL    Specific Gravity Urine >1.030 (H) 1.000 - 1.030    Blood Urine Moderate (A) NEG^Negative    pH Urine 5.5 5.0 - 9.0 pH    Protein Albumin Urine Negative NEG^Negative mg/dL    Urobilinogen Urine 1.0 0.2 - 1.0 EU/dL    Nitrite Urine Negative NEG^Negative    Leukocyte Esterase Urine Negative NEG^Negative    Source Midstream Urine    Urine Microscopic   Result Value Ref Range    WBC Urine 0 - 5 OTO5^0 - 5 /HPF    RBC Urine 2-5 (A) OTO2^O - 2 /HPF    Squamous Epithelial /LPF Urine Many (A) FEW^Few /LPF    Bacteria Urine Few (A) NEG^Negative /HPF   CT Abdomen Pelvis w/o Contrast    Narrative    PROCEDURE:  CT ABDOMEN PELVIS W/O CONTRAST    HISTORY:  Urinary tract stone, known, symptomatic, complications or  risk factors. Left flank pain.    TECHNIQUE:  Helical CT of the abdomen and pelvis was performed without  intravenous contrast. Sagittal and coronal reformatted images were  reviewed.    COMPARISON:  11/9/2018, outside ultrasound 1/4/2019    FINDINGS:      The lung bases are clear.    The noncontrast appearance of the liver, spleen, pancreas and adrenal  glands is unremarkable. There has been prior cholecystectomy.    There are nonobstructive calculi in the right kidney. No right-sided  ureteral calculi or hydronephrosis. There is moderate left  hydronephrosis and perinephric stranding secondary to a 6 mm calculus  in the distal left ureter.    There are multiple  diverticula in the colon without evidence of  diverticulitis. The bowel is normal in caliber. The appendix is  unremarkable.     The aorta is normal in size with scattered atherosclerotic  calcifications.    No free fluid, free air or adenopathy is present.      No suspicious osseous lesions are identified.      Impression    IMPRESSION:  Moderate left hydronephrosis and perinephric stranding  secondary to a 6 mm distal left ureteral calculus. The patient had  left ureteral calculi on the prior study but the degree of  hydronephrosis and perinephric stranding has increased.    GLADIS ESPAÑA MD       Medications   HYDROmorphone (PF) (DILAUDID) injection 0.5 mg ( Intravenous Canceled Entry 1/28/19 0619)   fentaNYL (PF) (SUBLIMAZE) injection 50 mcg (not administered)   acetaminophen (TYLENOL) tablet 650 mg (650 mg Oral Given 1/28/19 0632)   ibuprofen (ADVIL/MOTRIN) tablet 600 mg (600 mg Oral Given 1/28/19 0633)   lactated ringers BOLUS 500 mL (0 mLs Intravenous Stopped 1/28/19 0809)   lactated ringers BOLUS 1,000 mL (0 mLs Intravenous Stopped 1/28/19 1017)       Assessments & Plan (with Medical Decision Making)     Diane presented to the emergency department for evaluation of ongoing left-sided flank pain in the setting of recently diagnosed ureterolithiasis.  Therefore, initial workup will include CBC, BMP, and urinalysis.  Given her comp gated history of recently noted bilateral hydronephrosis, it would be important to evaluate for urinary tract infection.  Should this be the case urology involvement will be necessary.  At the change of shift her workup was still in process.  She was then signed out to Dr. Tonio Chance.  For the remainder of her ED course, please refer to his documentation below.    9:03 AM patient able to give just 100ml of Urine after IVF bolus and it is concentrated but without obvious infection or signficant blood.  She is very stressed about her 's medical problems: lung and brain  cancer; and while she is worried about insurance coverage of additional testing she wants to get to the bottom of the cause of her sx.  She has received 500ml bolus of LR so far and will give another liter.  I will discuss work up so far and recommendations with Dr. Yepez.  Reviewed with Shamika and will get images and recent w/u to Dr. Yepez.  Austin Chance MD.    9:37 AM I discussed with Dr. Yepez recommending CT stone protocol to determine if previously seen stone is still present.  If so, recommending hospitalization with Stone retrieval tomorrow.  I have discussed with patient and she agrees with plan.  Austin Chance MD.    10:24 AM 6mm left distal ureteral stone with significant increased hydronephrosis and perinephric stranding.  Discussed with Dr. Mendez and will admit for IV pain control and procedure tomorrow.       I have reviewed the nursing notes.    I have reviewed the findings, diagnosis, plan and need for follow up with the patient.       Medication List      There are no discharge medications for this visit.         Final diagnoses:   Calculus of distal left ureter - 6mm stone       1/28/2019   [facility] \[facility]""  Austin Chance MD on 1/28/2019 at 7:30 AM      Monique Peralta MD  01/28/19 0730       Monique Peralta MD  01/28/19 0731       Austin Chance MD  01/28/19 0913       Austin Chance MD  01/28/19 [id number]

## 2019-01-28 NOTE — PROGRESS NOTES
Pt brought back to ER pt had to use restroom,RN was notified that pt was in restroom. Communicated to Litzy MONTENEGRO

## 2019-01-28 NOTE — PROGRESS NOTES
Patient states she has minimal pain 2/10 on left lateral side of lower abd. Using hot packs for pain control. Voiding without difficulty. Straining urine. VSS. Denies nausea. Tolerating regular diet and water. Will continue to monitor and intervene as needed. Dorothy Zapata RN on 1/28/2019 at 4:24 PM

## 2019-01-28 NOTE — PROGRESS NOTES
Patient has been admitted to [facility] from ER. VSS. A&O x 4. Requesting food. Dorothy Zapata RN on 1/28/2019 at 11:13 AM

## 2019-01-28 NOTE — PROGRESS NOTES
Nurse to nurse from Litzy RN in ER. All questions answered. Dorothy Zapata, RN on 1/28/2019 at 10:51 AM

## 2019-01-28 NOTE — DISCHARGE INSTRUCTIONS
While you have a ureteral stent(s) in place you will have blood in your urine.  The blood can go away and come back at various time intervals but this is of no concern as long as clots are not plugging your ability to pee.  You will also have pain with urination and/or pain of the kidney with urination.  Please make sure you so not withhold fluid intake during these times.  Withholding fluid intake will just make the symptoms worse. Please increase your fluid intake as this will help flush the urinary system and prevent clots from plugging the urinary tract,.

## 2019-01-28 NOTE — ED TRIAGE NOTES
Presents to triage ambulatory with complaints of left lateral abdominal pain with burning and urgency to void with inability to void.  The pain started at 0130 tonight.  The voiding symptoms have been going on since [place] time and has gotten worse.  She was seen in [place] for the voiding symptoms.  Denies fevers.  Nausea with out vomiting with the pain.  Slight diarrhea since yesterday.

## 2019-01-28 NOTE — ED NOTES
Pt declines IV dilaudid or any narcotics at this time, reporting that it would make her sick to her stomach and she would rather not take anything.  MD notified.

## 2019-01-28 NOTE — CONSULTS
I was asked to see this patient by Dr Mendez and provide my opinion about the following:  Ureteral stone    Type of Visit  Consult    Chief Complaint  Ureteral stone    HPI  Ms. Ricardo is a 73 year old female who presents with left ureteral stone.  The patient initially presented to the ED today and was ultimately admitted for IV pain control  While in the emergency department the patient underwent a CT scan which revealed a 6 mm obstructing stone.    The patient was originally diagnosed 3 weeks ago while traveling in [place].  Due to left flank pain she visited the emergency department and underwent a CT scan revealing an obstructing 6 mm left stone.    The patient currently denies fevers or chills.  The patient currently denies nausea or vomiting.    Pain ROS  Location:  Left flank  Quality:    Sharp and Dull  Provocative factors:  Nothing makes it worse  Palliative factors:   IV narcotics make it better  Radiation:   Along flank  Severity:   3/10 currently  Time:     Pain started 3 weeks ago      Past Medical History  She  has a past medical history of Elevated blood pressure reading without diagnosis of hypertension, Hyperlipidemia, and Prediabetes.  Patient Active Problem List   Diagnosis     ACP (advance care planning)     Elevated blood pressure reading without diagnosis of hypertension     Hyperlipidemia     Prediabetes     Ureteral stone     Renal lithiasis       Past Surgical History  She  has a past surgical history that includes Tonsillectomy, adenoidectomy, combined and Laparoscopic cholecystectomy.    Medications    Current Facility-Administered Medications:      acetaminophen (TYLENOL) tablet 650 mg, 650 mg, Oral, Q4H PRN, Noah Mendez MD     docusate sodium (COLACE) capsule 100 mg, 100 mg, Oral, BID, Noah Mendez MD     fentaNYL (PF) (SUBLIMAZE) injection 50 mcg, 50 mcg, Intravenous, Q1H PRN, Austin Chance MD, 50 mcg at 01/28/19 1040     HYDROcodone-acetaminophen (NORCO)  5-325 MG per tablet 1-2 tablet, 1-2 tablet, Oral, Q4H PRN, Noah Mendez MD     HYDROmorphone (PF) (DILAUDID) injection 0.2 mg, 0.2 mg, Intravenous, Q2H PRN, Noah Mendez MD     HYDROmorphone (PF) (DILAUDID) injection 0.5 mg, 0.5 mg, Intravenous, Once, Monique Peralta MD     naloxone (NARCAN) injection 0.1-0.4 mg, 0.1-0.4 mg, Intravenous, Q2 Min PRN, Noah Mendez MD     ondansetron (ZOFRAN-ODT) ODT tab 4 mg, 4 mg, Oral, Q6H PRN **OR** ondansetron (ZOFRAN) injection 4 mg, 4 mg, Intravenous, Q6H PRN, Noah Mendez MD     senna-docusate (SENOKOT-S/PERICOLACE) 8.6-50 MG per tablet 1 tablet, 1 tablet, Oral, BID PRN **OR** senna-docusate (SENOKOT-S/PERICOLACE) 8.6-50 MG per tablet 2 tablet, 2 tablet, Oral, BID PRN, Noah Mendez MD      Allergies  No Known Allergies    Social History  She  reports that she quit smoking about 33 years ago. Her smoking use included cigarettes. She has a 8.80 pack-year smoking history. she has never used smokeless tobacco. She reports that she does not drink alcohol or use drugs.  No drug abuse.    Family History  Family History   Problem Relation Age of Onset     Cancer Mother         Cancer,Spinal Cancer     Prostate Cancer Other         Cancer-prostate     Cancer Other         Cancer,thyroid cancer     Unknown/Adopted Father         Unknown     Myocardial Infarction Maternal Grandmother 74        Heart attack     Asthma Maternal Grandfather         Asthma     Cancer Maternal Aunt         Cancer,Hodgkin's lymphoma     Other - See Comments Son         No Known Problems     Breast Cancer No family hx of         Cancer-breast       Review of Systems  I personally reviewed the ROS with the patient.    Unintentional weight loss:  No  Recent fever/chills: No   Night sweats: No   Current skin rash: No   Recent hair loss: No   Heat intolerance: No   Cold intolerance: No   Chest pain: No   Palpitations: No   Shortness of breath: No  Wheezing: No   Constipation: No  "  Diarrhea: No   Nausea: Yes    Vomiting: No   Kidney/side pain: Yes   Back pain: No  Frequent headaches: No  Dizziness: No   Leg swelling: No   Calf pain: No      Physical Exam  Vitals:    01/28/19 1000 01/28/19 1030 01/28/19 1116 01/28/19 1147   BP: 121/60 126/55 137/57    Pulse: 73 68 68    Resp:  14 16    Temp:   98.5  F (36.9  C)    TempSrc:   Tympanic    SpO2: 97% 96% 96%    Weight:    101.5 kg (223 lb 12.8 oz)   Height:   1.549 m (5' 1\")      Constitutional: NAD, WDWN Uncomfortable  Head: NCAT  Eyes: Conjunctivae normal  Cardiovascular: Regular rate  Pulmonary/Chest: Respirations are even and non-labored bilaterally  Abdominal: Soft with no distension, tenderness, masses, guarding.    + left CVA tenderness    - right CVA tenderness  Neurological: A + O x 3,  cranial nerves II-XII grossly intact  Extremities: ABEL x 4, warm, no clubbing, no cyanosis  Skin: Pink, warm, dry with no rash  Psychiatric:  Normal mood and affect  Genitourinary: Nonpalpable bladder    Labs  Results for orders placed or performed during the hospital encounter of 01/28/19   CT Abdomen Pelvis w/o Contrast    Narrative    PROCEDURE:  CT ABDOMEN PELVIS W/O CONTRAST    HISTORY:  Urinary tract stone, known, symptomatic, complications or  risk factors. Left flank pain.    TECHNIQUE:  Helical CT of the abdomen and pelvis was performed without  intravenous contrast. Sagittal and coronal reformatted images were  reviewed.    COMPARISON:  11/9/2018, outside ultrasound 1/4/2019    FINDINGS:      The lung bases are clear.    The noncontrast appearance of the liver, spleen, pancreas and adrenal  glands is unremarkable. There has been prior cholecystectomy.    There are nonobstructive calculi in the right kidney. No right-sided  ureteral calculi or hydronephrosis. There is moderate left  hydronephrosis and perinephric stranding secondary to a 6 mm calculus  in the distal left ureter.    There are multiple diverticula in the colon without evidence " of  diverticulitis. The bowel is normal in caliber. The appendix is  unremarkable.     The aorta is normal in size with scattered atherosclerotic  calcifications.    No free fluid, free air or adenopathy is present.      No suspicious osseous lesions are identified.      Impression    IMPRESSION:  Moderate left hydronephrosis and perinephric stranding  secondary to a 6 mm distal left ureteral calculus. The patient had  left ureteral calculi on the prior study but the degree of  hydronephrosis and perinephric stranding has increased.    GLADIS ESPAÑA MD   CBC with platelets differential   Result Value Ref Range    WBC 13.9 (H) 4.0 - 11.0 10e9/L    RBC Count 4.47 3.8 - 5.2 10e12/L    Hemoglobin 13.2 11.7 - 15.7 g/dL    Hematocrit 40.0 35.0 - 47.0 %    MCV 90 78 - 100 fl    MCH 29.5 26.5 - 33.0 pg    MCHC 33.0 31.5 - 36.5 g/dL    RDW 13.8 10.0 - 15.0 %    Platelet Count 229 150 - 450 10e9/L    Diff Method Automated Method     % Neutrophils 84.6 %    % Lymphocytes 8.3 %    % Monocytes 6.0 %    % Eosinophils 0.3 %    % Basophils 0.3 %    % Immature Granulocytes 0.5 %    Absolute Neutrophil 11.8 (H) 1.6 - 8.3 10e9/L    Absolute Lymphocytes 1.2 0.8 - 5.3 10e9/L    Absolute Monocytes 0.8 0.0 - 1.3 10e9/L    Absolute Eosinophils 0.0 0.0 - 0.7 10e9/L    Absolute Basophils 0.0 0.0 - 0.2 10e9/L    Abs Immature Granulocytes 0.1 0 - 0.4 10e9/L   Basic metabolic panel   Result Value Ref Range    Sodium 139 134 - 144 mmol/L    Potassium 3.9 3.5 - 5.1 mmol/L    Chloride 107 98 - 107 mmol/L    Carbon Dioxide 21 21 - 31 mmol/L    Anion Gap 11 3 - 14 mmol/L    Glucose 136 (H) 70 - 105 mg/dL    Urea Nitrogen 22 7 - 25 mg/dL    Creatinine 0.96 0.60 - 1.20 mg/dL    GFR Estimate 57 (L) >60 mL/min/[1.73_m2]    GFR Estimate If Black 69 >60 mL/min/[1.73_m2]    Calcium 9.0 8.6 - 10.3 mg/dL   UA reflex to Microscopic and Culture   Result Value Ref Range    Color Urine Yellow     Appearance Urine Clear     Glucose Urine Negative NEG^Negative  "mg/dL    Bilirubin Urine Small (A) NEG^Negative    Ketones Urine Trace (A) NEG^Negative mg/dL    Specific Gravity Urine >1.030 (H) 1.000 - 1.030    Blood Urine Moderate (A) NEG^Negative    pH Urine 5.5 5.0 - 9.0 pH    Protein Albumin Urine Negative NEG^Negative mg/dL    Urobilinogen Urine 1.0 0.2 - 1.0 EU/dL    Nitrite Urine Negative NEG^Negative    Leukocyte Esterase Urine Negative NEG^Negative    Source Midstream Urine    Urine Microscopic   Result Value Ref Range    WBC Urine 0 - 5 OTO5^0 - 5 /HPF    RBC Urine 2-5 (A) OTO2^O - 2 /HPF    Squamous Epithelial /LPF Urine Many (A) FEW^Few /LPF    Bacteria Urine Few (A) NEG^Negative /HPF       Imaging  I personally reviewed and interpreted the images and report.  CT a/p   1/28/2019  IMPRESSION:  Moderate left hydronephrosis and perinephric stranding  secondary to a 6 mm distal left ureteral calculus. The patient had  left ureteral calculi on the prior study but the degree of  hydronephrosis and perinephric stranding has increased.    Assessment  Ms. Ricardo is a 73 year old female who presents with painful left ureteral stone which she has been unable to pass over the last 3 weeks..    Discussed the treatment options for the left stone including trial of passage and ureteroscopy with laser lithotripsy.    After explaining the risks, benefits, and alternatives a decision was made to proceed with ureteroscopy/laser lithotripsy.    The patient was explained the specific risks of bleeding, pain, infection, and ureteral injury.    In addition, the patient was told a stent may need to be placed which could result in urinary frequency, urgency, dysuria, and flank pain with voiding.    It would require an office based procedure to remove it at a later date.    Finally, the patient was told if the stone was very impacted, that we would place a stent and return at a later date to treat the stone.      Plan  The patient was scheduled and consented for \"left ureteroscopy with " "holmium laser lithotripsy and stent placement\" in the OR (LMA general anesthesia).       "

## 2019-01-29 ENCOUNTER — ANESTHESIA EVENT (OUTPATIENT)
Dept: SURGERY | Facility: OTHER | Age: 74
DRG: 661 | End: 2019-01-29
Payer: COMMERCIAL

## 2019-01-29 ENCOUNTER — APPOINTMENT (OUTPATIENT)
Dept: GENERAL RADIOLOGY | Facility: OTHER | Age: 74
DRG: 661 | End: 2019-01-29
Payer: COMMERCIAL

## 2019-01-29 ENCOUNTER — ANESTHESIA (OUTPATIENT)
Dept: SURGERY | Facility: OTHER | Age: 74
DRG: 661 | End: 2019-01-29
Payer: COMMERCIAL

## 2019-01-29 VITALS
SYSTOLIC BLOOD PRESSURE: 127 MMHG | HEART RATE: 72 BPM | HEIGHT: 61 IN | TEMPERATURE: 99.1 F | WEIGHT: 222.22 LBS | BODY MASS INDEX: 41.96 KG/M2 | RESPIRATION RATE: 16 BRPM | DIASTOLIC BLOOD PRESSURE: 74 MMHG | OXYGEN SATURATION: 94 %

## 2019-01-29 LAB
ANION GAP SERPL CALCULATED.3IONS-SCNC: 9 MMOL/L (ref 3–14)
BUN SERPL-MCNC: 19 MG/DL (ref 7–25)
CALCIUM SERPL-MCNC: 8.6 MG/DL (ref 8.6–10.3)
CHLORIDE SERPL-SCNC: 106 MMOL/L (ref 98–107)
CO2 SERPL-SCNC: 26 MMOL/L (ref 21–31)
CREAT SERPL-MCNC: 1.19 MG/DL (ref 0.6–1.2)
ERYTHROCYTE [DISTWIDTH] IN BLOOD BY AUTOMATED COUNT: 14 % (ref 10–15)
GFR SERPL CREATININE-BSD FRML MDRD: 44 ML/MIN/{1.73_M2}
GLUCOSE SERPL-MCNC: 103 MG/DL (ref 70–105)
HCT VFR BLD AUTO: 35.7 % (ref 35–47)
HGB BLD-MCNC: 11.4 G/DL (ref 11.7–15.7)
MCH RBC QN AUTO: 29.5 PG (ref 26.5–33)
MCHC RBC AUTO-ENTMCNC: 31.9 G/DL (ref 31.5–36.5)
MCV RBC AUTO: 92 FL (ref 78–100)
PLATELET # BLD AUTO: 219 10E9/L (ref 150–450)
POTASSIUM SERPL-SCNC: 3.6 MMOL/L (ref 3.5–5.1)
RBC # BLD AUTO: 3.87 10E12/L (ref 3.8–5.2)
SODIUM SERPL-SCNC: 141 MMOL/L (ref 134–144)
WBC # BLD AUTO: 7.2 10E9/L (ref 4–11)

## 2019-01-29 PROCEDURE — 25800025 ZZH RX 258: Performed by: UROLOGY

## 2019-01-29 PROCEDURE — 99100 ANES PT EXTEME AGE<1 YR&>70: CPT | Performed by: NURSE ANESTHETIST, CERTIFIED REGISTERED

## 2019-01-29 PROCEDURE — 37000009 ZZH ANESTHESIA TECHNICAL FEE, EACH ADDTL 15 MIN: Performed by: UROLOGY

## 2019-01-29 PROCEDURE — 27210794 ZZH OR GENERAL SUPPLY STERILE: Performed by: UROLOGY

## 2019-01-29 PROCEDURE — 0TC78ZZ EXTIRPATION OF MATTER FROM LEFT URETER, VIA NATURAL OR ARTIFICIAL OPENING ENDOSCOPIC: ICD-10-PCS | Performed by: UROLOGY

## 2019-01-29 PROCEDURE — 25500064 ZZH RX 255 OP 636: Performed by: UROLOGY

## 2019-01-29 PROCEDURE — 71000014 ZZH RECOVERY PHASE 1 LEVEL 2 FIRST HR: Performed by: UROLOGY

## 2019-01-29 PROCEDURE — 52356 CYSTO/URETERO W/LITHOTRIPSY: CPT | Performed by: UROLOGY

## 2019-01-29 PROCEDURE — 0T778DZ DILATION OF LEFT URETER WITH INTRALUMINAL DEVICE, VIA NATURAL OR ARTIFICIAL OPENING ENDOSCOPIC: ICD-10-PCS | Performed by: UROLOGY

## 2019-01-29 PROCEDURE — 85027 COMPLETE CBC AUTOMATED: CPT | Performed by: FAMILY MEDICINE

## 2019-01-29 PROCEDURE — 36415 COLL VENOUS BLD VENIPUNCTURE: CPT | Performed by: FAMILY MEDICINE

## 2019-01-29 PROCEDURE — 25000566 ZZH SEVOFLURANE, EA 15 MIN: Performed by: UROLOGY

## 2019-01-29 PROCEDURE — 74420 UROGRAPHY RTRGR +-KUB: CPT | Mod: 26 | Performed by: UROLOGY

## 2019-01-29 PROCEDURE — 52356 CYSTO/URETERO W/LITHOTRIPSY: CPT | Performed by: NURSE ANESTHETIST, CERTIFIED REGISTERED

## 2019-01-29 PROCEDURE — 82365 CALCULUS SPECTROSCOPY: CPT | Performed by: UROLOGY

## 2019-01-29 PROCEDURE — C1758 CATHETER, URETERAL: HCPCS | Performed by: UROLOGY

## 2019-01-29 PROCEDURE — 37000008 ZZH ANESTHESIA TECHNICAL FEE, 1ST 30 MIN: Performed by: UROLOGY

## 2019-01-29 PROCEDURE — C1769 GUIDE WIRE: HCPCS | Performed by: UROLOGY

## 2019-01-29 PROCEDURE — 25000128 H RX IP 250 OP 636: Performed by: FAMILY MEDICINE

## 2019-01-29 PROCEDURE — 36000058 ZZH SURGERY LEVEL 3 EA 15 ADDTL MIN: Performed by: UROLOGY

## 2019-01-29 PROCEDURE — C2617 STENT, NON-COR, TEM W/O DEL: HCPCS | Performed by: UROLOGY

## 2019-01-29 PROCEDURE — 25000128 H RX IP 250 OP 636: Performed by: NURSE ANESTHETIST, CERTIFIED REGISTERED

## 2019-01-29 PROCEDURE — 99238 HOSP IP/OBS DSCHRG MGMT 30/<: CPT | Performed by: FAMILY MEDICINE

## 2019-01-29 PROCEDURE — 25000128 H RX IP 250 OP 636: Performed by: UROLOGY

## 2019-01-29 PROCEDURE — 80048 BASIC METABOLIC PNL TOTAL CA: CPT | Performed by: FAMILY MEDICINE

## 2019-01-29 PROCEDURE — 25000125 ZZHC RX 250: Performed by: NURSE ANESTHETIST, CERTIFIED REGISTERED

## 2019-01-29 PROCEDURE — 40000306 ZZH STATISTIC PRE PROC ASSESS II: Performed by: UROLOGY

## 2019-01-29 PROCEDURE — 36000060 ZZH SURGERY LEVEL 3 W FLUORO 1ST 30 MIN: Performed by: UROLOGY

## 2019-01-29 DEVICE — STENT URETERAL SOF-CURL TECOFLEX 6FRX24CM SSC6024: Type: IMPLANTABLE DEVICE | Site: KIDNEY | Status: FUNCTIONAL

## 2019-01-29 RX ORDER — FENTANYL CITRATE 50 UG/ML
25-50 INJECTION, SOLUTION INTRAMUSCULAR; INTRAVENOUS
Status: DISCONTINUED | OUTPATIENT
Start: 2019-01-29 | End: 2019-01-29 | Stop reason: HOSPADM

## 2019-01-29 RX ORDER — NALOXONE HYDROCHLORIDE 0.4 MG/ML
.1-.4 INJECTION, SOLUTION INTRAMUSCULAR; INTRAVENOUS; SUBCUTANEOUS
Status: CANCELLED | OUTPATIENT
Start: 2019-01-29 | End: 2019-01-30

## 2019-01-29 RX ORDER — ONDANSETRON 2 MG/ML
4 INJECTION INTRAMUSCULAR; INTRAVENOUS EVERY 30 MIN PRN
Status: DISCONTINUED | OUTPATIENT
Start: 2019-01-29 | End: 2019-01-29 | Stop reason: HOSPADM

## 2019-01-29 RX ORDER — LIDOCAINE HYDROCHLORIDE 20 MG/ML
INJECTION, SOLUTION INFILTRATION; PERINEURAL PRN
Status: DISCONTINUED | OUTPATIENT
Start: 2019-01-29 | End: 2019-01-29

## 2019-01-29 RX ORDER — HYDROMORPHONE HYDROCHLORIDE 1 MG/ML
.3-.5 INJECTION, SOLUTION INTRAMUSCULAR; INTRAVENOUS; SUBCUTANEOUS EVERY 5 MIN PRN
Status: DISCONTINUED | OUTPATIENT
Start: 2019-01-29 | End: 2019-01-29 | Stop reason: HOSPADM

## 2019-01-29 RX ORDER — DEXAMETHASONE SODIUM PHOSPHATE 4 MG/ML
INJECTION, SOLUTION INTRA-ARTICULAR; INTRALESIONAL; INTRAMUSCULAR; INTRAVENOUS; SOFT TISSUE PRN
Status: DISCONTINUED | OUTPATIENT
Start: 2019-01-29 | End: 2019-01-29

## 2019-01-29 RX ORDER — ONDANSETRON 4 MG/1
4 TABLET, ORALLY DISINTEGRATING ORAL EVERY 30 MIN PRN
Status: DISCONTINUED | OUTPATIENT
Start: 2019-01-29 | End: 2019-01-29 | Stop reason: HOSPADM

## 2019-01-29 RX ORDER — KETOROLAC TROMETHAMINE 30 MG/ML
INJECTION, SOLUTION INTRAMUSCULAR; INTRAVENOUS PRN
Status: DISCONTINUED | OUTPATIENT
Start: 2019-01-29 | End: 2019-01-29

## 2019-01-29 RX ORDER — PROPOFOL 10 MG/ML
INJECTION, EMULSION INTRAVENOUS PRN
Status: DISCONTINUED | OUTPATIENT
Start: 2019-01-29 | End: 2019-01-29

## 2019-01-29 RX ORDER — OXYCODONE AND ACETAMINOPHEN 5; 325 MG/1; MG/1
1-2 TABLET ORAL EVERY 4 HOURS PRN
Qty: 30 TABLET | Refills: 0 | Status: SHIPPED | OUTPATIENT
Start: 2019-01-29 | End: 2019-02-07

## 2019-01-29 RX ORDER — SODIUM CHLORIDE 9 MG/ML
INJECTION, SOLUTION INTRAVENOUS CONTINUOUS
Status: DISCONTINUED | OUTPATIENT
Start: 2019-01-29 | End: 2019-01-29 | Stop reason: HOSPADM

## 2019-01-29 RX ORDER — CEFTRIAXONE SODIUM 1 G/50ML
1 INJECTION, SOLUTION INTRAVENOUS
Status: COMPLETED | OUTPATIENT
Start: 2019-01-29 | End: 2019-01-29

## 2019-01-29 RX ORDER — LIDOCAINE 40 MG/G
CREAM TOPICAL
Status: DISCONTINUED | OUTPATIENT
Start: 2019-01-29 | End: 2019-01-29 | Stop reason: HOSPADM

## 2019-01-29 RX ORDER — GLYCOPYRROLATE 0.2 MG/ML
INJECTION, SOLUTION INTRAMUSCULAR; INTRAVENOUS PRN
Status: DISCONTINUED | OUTPATIENT
Start: 2019-01-29 | End: 2019-01-29

## 2019-01-29 RX ADMIN — DEXAMETHASONE SODIUM PHOSPHATE 4 MG: 4 INJECTION, SOLUTION INTRA-ARTICULAR; INTRALESIONAL; INTRAMUSCULAR; INTRAVENOUS; SOFT TISSUE at 14:28

## 2019-01-29 RX ADMIN — LIDOCAINE HYDROCHLORIDE 100 MG: 20 INJECTION, SOLUTION INFILTRATION; PERINEURAL at 14:13

## 2019-01-29 RX ADMIN — GLYCOPYRROLATE 0.2 MG: 0.2 INJECTION, SOLUTION INTRAMUSCULAR; INTRAVENOUS at 14:23

## 2019-01-29 RX ADMIN — PROPOFOL 200 MG: 10 INJECTION, EMULSION INTRAVENOUS at 14:13

## 2019-01-29 RX ADMIN — PHENYLEPHRINE HYDROCHLORIDE 100 MCG: 10 INJECTION, SOLUTION INTRAMUSCULAR; INTRAVENOUS; SUBCUTANEOUS at 14:19

## 2019-01-29 RX ADMIN — ONDANSETRON 4 MG: 2 INJECTION INTRAMUSCULAR; INTRAVENOUS at 14:13

## 2019-01-29 RX ADMIN — FENTANYL CITRATE 50 MCG: 50 INJECTION, SOLUTION INTRAMUSCULAR; INTRAVENOUS at 14:13

## 2019-01-29 RX ADMIN — KETOROLAC TROMETHAMINE 30 MG: 30 INJECTION, SOLUTION INTRAMUSCULAR at 14:30

## 2019-01-29 RX ADMIN — CEFTRIAXONE SODIUM 1 G: 1 INJECTION, SOLUTION INTRAVENOUS at 14:16

## 2019-01-29 RX ADMIN — SODIUM CHLORIDE: 900 INJECTION, SOLUTION INTRAVENOUS at 11:53

## 2019-01-29 RX ADMIN — FENTANYL CITRATE 50 MCG: 50 INJECTION, SOLUTION INTRAMUSCULAR; INTRAVENOUS at 14:38

## 2019-01-29 ASSESSMENT — ACTIVITIES OF DAILY LIVING (ADL)
ADLS_ACUITY_SCORE: 10

## 2019-01-29 ASSESSMENT — MIFFLIN-ST. JEOR: SCORE: 1450.38

## 2019-01-29 NOTE — DISCHARGE SUMMARY
[facility]  Hospitalist Discharge Summary       Date of Admission:  1/28/2019  Date of Discharge:  1/29/2019  Discharging Provider: Noah Mendez MD      Discharge Diagnoses   Renal lithiasis    Follow-ups Needed After Discharge       Unresulted Labs Ordered in the Past 30 Days of this Admission     Date and Time Order Name Status Description    1/29/2019 1431 Stone analysis In process     1/28/2019 0902 Urine Culture Aerobic Bacterial In process       These results will be followed up by Dr. Yepez    [facility] Course      Diane Ricardo is a 73 year old female admitted on 1/28/2019. She developed acute left flank pain at 130 on January 28.  Found to have obstructing distal left ureteral calculus 6 mm in diameter.  She required IV fentanyl to control her pain.  She was admitted for pain management and urologic consultation management.  She underwent stone extraction and stent placement.  Was doing well at the time of discharge and not requiring any pain medication.        Consultations This Hospital Stay   None    Code Status   Full Code    Time Spent on this Encounter   I, Noah Mendez, personally saw the patient today and spent less than or equal to 30 minutes discharging this patient.       Noah Mendez MD  [facility]  ______________________________________________________________________    Physical Exam   Vital Signs: Temp: 98.8  F (37.1  C) Temp src: Tympanic BP: 138/50 Pulse: 75   Resp: 16 SpO2: 96 % O2 Device: None (Room air) Oxygen Delivery: 2 LPM  Weight: 222 lbs 3.58 oz  General Appearance: NAD  Respiratory: Clear toascultation bilaterally.  No increased respiratory effort.   Cardiovascular: Regularrate and rhythm.  No murmurs rubs or gallops heard.   GI: Abdomen Soft, non-tender.  No masses, rebound or guarding.   Skin: No rashes    Primary Care Physician   Savita Tariq    Discharge Disposition   Discharged to home  Condition at discharge:  Stable    Significant Results and Procedures   Results for orders placed or performed during the hospital encounter of 01/28/19   CT Abdomen Pelvis w/o Contrast    Narrative    PROCEDURE:  CT ABDOMEN PELVIS W/O CONTRAST    HISTORY:  Urinary tract stone, known, symptomatic, complications or  risk factors. Left flank pain.    TECHNIQUE:  Helical CT of the abdomen and pelvis was performed without  intravenous contrast. Sagittal and coronal reformatted images were  reviewed.    COMPARISON:  11/9/2018, outside ultrasound 1/4/2019    FINDINGS:      The lung bases are clear.    The noncontrast appearance of the liver, spleen, pancreas and adrenal  glands is unremarkable. There has been prior cholecystectomy.    There are nonobstructive calculi in the right kidney. No right-sided  ureteral calculi or hydronephrosis. There is moderate left  hydronephrosis and perinephric stranding secondary to a 6 mm calculus  in the distal left ureter.    There are multiple diverticula in the colon without evidence of  diverticulitis. The bowel is normal in caliber. The appendix is  unremarkable.     The aorta is normal in size with scattered atherosclerotic  calcifications.    No free fluid, free air or adenopathy is present.      No suspicious osseous lesions are identified.      Impression    IMPRESSION:  Moderate left hydronephrosis and perinephric stranding  secondary to a 6 mm distal left ureteral calculus. The patient had  left ureteral calculi on the prior study but the degree of  hydronephrosis and perinephric stranding has increased.    GLADIS ESPAÑA MD   XR Surgery RUFINA L/T 5 Min Fluoro    Narrative    This exam was marked as non-reportable because it will not be read by a   radiologist or a [facility] non-radiologist provider.                   Discharge Orders      No driving or operating machinery     until the day after procedure     No Alcohol    for 24 hours post procedure     Diet Instructions    Resume pre procedure diet      Encourage fluids    Encourage fluids at home to keep urine clear to light pink     Discharge Instructions    Follow up appointment in about 1 week for stent pull     Discharge Medications   Current Discharge Medication List      START taking these medications    Details   oxyCODONE-acetaminophen (PERCOCET) 5-325 MG tablet Take 1-2 tablets by mouth every 4 hours as needed for severe pain  Qty: 30 tablet, Refills: 0    Associated Diagnoses: Ureteral stone         CONTINUE these medications which have NOT CHANGED    Details   aspirin 81 MG tablet Take 81 mg by mouth daily with food      Calcium Carb-Cholecalciferol (CALCIUM 600 + D PO) Take 1 tablet by mouth 2 times daily       lovastatin (MEVACOR) 20 MG tablet Take 1 tablet (20 mg) by mouth At Bedtime  Qty: 90 tablet, Refills: 4    Associated Diagnoses: Pure hypercholesterolemia      Multiple Vitamins-Minerals (ICAPS MV PO) Take 1 capsule by mouth 4 times daily            Allergies   No Known Allergies

## 2019-01-29 NOTE — PROGRESS NOTES
[facility] DISCHARGE NOTE    Patient discharged to home at 4:40 PM via ambulation. Accompanied by spouse, son and staff. Discharge instructions reviewed with patient, opportunity offered to ask questions. Narcotic Prescription for Percocet 5/325 sent with patient to fill. IV access removed.  All belongings sent with patient.    Carmen Morocho

## 2019-01-29 NOTE — PROGRESS NOTES
"Patient returned from surgical procedure at 1520, left floor at 1120 with nurse to nurse reports given to MONI Zepeda and received from MONI Navarro upon return. Patient is denying any pain however does report pressure and feeling urgency to void.    Up to BRP with 50 ml bloody urine output.  /50 (BP Location: Left arm)   Pulse 75   Temp 98.8  F (37.1  C) (Tympanic)   Resp 16   Ht 1.549 m (5' 1\")   Wt 100.8 kg (222 lb 3.6 oz)   SpO2 96%   Breastfeeding? No   BMI 41.99 kg/m    Carmen Morocho RN on 1/29/2019 at 3:30 PM    "

## 2019-01-29 NOTE — OP NOTE
Preoperative diagnosis  Left ureteral stone    Postoperative diagnosis  Left ureteral stone    Procedure performed  Left ureteroscopy with holmium-YAG laser lithotripsy and basket extraction of stone fragments  Cystoscopy with left retrograde pyelogram and ureteral stent placement  Interpretation of retrograde    Surgeon  Jason Yepez MD    Surgeon(s)/Proceduralist(s) and Assistants (if any)  Surgeon(s):  Jason Yepez MD  Circulator: Tatiana Newberry RN  : Destinee Escobar Circulator: Lux Connor RN  Scrub Person: Shannon Armijo  First Assistant: Karina Medrano RN  Assist Patient Positioning: Tracee Chance RN    Specimen(s)  Yes  Stone fragments for biochemical analysis    (EBL) Estimated blood loss (ml)  5    Anesthesia  General    Complications  None    Findings  Cystoscopy revealed no tumors, stones or other mucosal abnormalities.  Retrograde pyelogram revealed a  moderately dilated system with identification of the stone seen on imaging.    Indications  73 year old female agreed to undergo the above named procedure after discussion of the alternatives, risks and benefits.    Informed consent was obtained.      Procedure  The patient was taken to the operating room and placed supine on the operating table.  Pre-operative antibiotics were administered.  Bilateral lower extremity SCDs were placed.  After induction of general anesthesia the patient was positioned in dorsal lithotomy, prepped and draped in a sterile fashion.  A time-out was performed.      I passed a 14 Armenian flexible cystoscope carefully via urethra into the bladder.  The left ureteral orifice was identified and a Sensor wire was passed retrograde to the level of the kidney and confirmed by fluoroscopy.  The flexible scope was off-loaded and the bladder emptied with a straight catheter.  An 8-10 coaxial dilator was passed without difficulty and then removed.  The MR6A semirigid ureteroscope was passed  carefully along the Sensor wire through the urethra and into the distal ureter.  The stone was encountered and fragmented with a 200-micron holmium YAG laser fiber at laser settings of 0.8 joules and a frequency of 8 Hz.  The fragments were basket extracted.  At the completion of the procedure, all clinically significant fragments were removed from the ureter and only dust-like fragments remained.  The ureteroscope was withdrawn.  A 5-Honduran open-ended was passed over the wire and the wire removed.  A retrograde pyelogram was performed by slowly injecting 5 mL of 50% Omnipaque contrast via the 5 Honduran catheter with findings described above.  An Amplatz super-stiff was placed to the level of the renal pelvis confirmed by fluoroscopy.  A 6 x 24 Honduran stent was positioned with the upper end in the upper pole and the lower in the bladder confirmed by fluoroscopy. The bladder was emptied and the procedure was complete. The patient tolerated the procedure well and was stable throughout.    Plan  Follow up in clinic for stent pull in the next week or so.

## 2019-01-29 NOTE — ANESTHESIA POSTPROCEDURE EVALUATION
Patient: Diane Ricardo    Procedure(s):  Left URETEROSCOPY, LASER HOLMIUM LITHOTRIPSY and Stent Placement    Diagnosis:kidney/ureteral stone  Diagnosis Additional Information: No value filed.    Anesthesia Type:  General, LMA    Note:  Anesthesia Post Evaluation    Patient location during evaluation: PACU  Patient participation: Able to fully participate in evaluation  Level of consciousness: awake and alert  Pain management: adequate  Airway patency: patent  Cardiovascular status: acceptable  Respiratory status: acceptable  Hydration status: acceptable  PONV: none     Anesthetic complications: None          Last vitals:  Vitals:    01/29/19 1054 01/29/19 1156 01/29/19 1445   BP: 149/58 146/75    Pulse: 69 66    Resp: 16 16    Temp: 96.7  F (35.9  C)  98.4  F (36.9  C)   SpO2: 96% 96%          Electronically Signed By: SCOTT BRIDGES CRNA  January 29, 2019  3:02 PM

## 2019-01-29 NOTE — ANESTHESIA CARE TRANSFER NOTE
Patient: Diane Ricrado    Procedure(s):  Left URETEROSCOPY, LASER HOLMIUM LITHOTRIPSY and Stent Placement    Diagnosis: kidney/ureteral stone  Diagnosis Additional Information: No value filed.    Anesthesia Type:   General, LMA     Note:  Airway :Room Air  Patient transferred to:PACU  Handoff Report: Identifed the Patient, Identified the Reponsible Provider, Reviewed the pertinent medical history, Discussed the surgical course, Reviewed Intra-OP anesthesia mangement and issues during anesthesia, Set expectations for post-procedure period and Allowed opportunity for questions and acknowledgement of understanding      Vitals: (Last set prior to Anesthesia Care Transfer)    CRNA VITALS  1/29/2019 1413 - 1/29/2019 1446      1/29/2019             Resp Rate (set):  10                Electronically Signed By: SCOTT Peterson CRNA  January 29, 2019  2:46 PM

## 2019-01-29 NOTE — OR NURSING
PACU Transfer Note    Diane Ricardo was transferred to [facility] via cart.  Equipment used for transport:  oxygen.  Accompanied by:  Melanie Gamboa RN    PACU Respiratory Event Documentation     1) Episodes of Apnea greater than or equal to 10 seconds: 0    2) Bradypnea - less than 8 breaths per minute: 0    3) Pain score on 0 to 10 scale: 0    4) Pain-sedation mismatch (yes or no): no    5) Repeated 02 desaturation less than 90% (yes or no): no    Anesthesia notified? (yes or no): na    Any of the above events occuring repeatedly in separate 30 minute intervals may be considered recurrent PACU respiratory events.  Reoprt given to Eduar MONTENEGRO  Patient stable and meets phase 1 discharge criteria for transport from PACU.  .dign

## 2019-01-29 NOTE — ANESTHESIA PREPROCEDURE EVALUATION
Anesthesia Pre-Procedure Evaluation    Patient: Diane Ricardo   MRN: [id number] : 1945          Preoperative Diagnosis: kidney/ureteral stone    Procedure(s):  Left URETEROSCOPY, LASER HOLMIUM LITHOTRIPSY and Stent Placement    Past Medical History:   Diagnosis Date     Elevated blood pressure reading without diagnosis of hypertension     Elevated in clinic, monitors at home with normal values.     Hyperlipidemia     2011     Prediabetes     2015     Past Surgical History:   Procedure Laterality Date     LAPAROSCOPIC CHOLECYSTECTOMY      ,Laparoscopic cholecystectomy     TONSILLECTOMY, ADENOIDECTOMY, COMBINED      as a child       Anesthesia Evaluation     . Pt has had prior anesthetic.     No history of anesthetic complications          ROS/MED HX    ENT/Pulmonary:     (+)MEGAN risk factors hypertension, obese, , . .    Neurologic:  - neg neurologic ROS     Cardiovascular:  - neg cardiovascular ROS       METS/Exercise Tolerance:  >4 METS   Hematologic:  - neg hematologic  ROS       Musculoskeletal:  - neg musculoskeletal ROS       GI/Hepatic:  - neg GI/hepatic ROS       Renal/Genitourinary:     (+) Nephrolithiasis ,       Endo:  - neg endo ROS       Psychiatric:  - neg psychiatric ROS       Infectious Disease:  - neg infectious disease ROS       Malignancy:      - no malignancy   Other:    - neg other ROS                      Physical Exam  Normal systems: cardiovascular, pulmonary and dental    Airway   Mallampati: II  TM distance: >3 FB  Neck ROM: full  Comment: Small mouth opening    Dental     Cardiovascular   Rhythm and rate: regular and normal      Pulmonary             Lab Results   Component Value Date    WBC 7.2 2019    HGB 11.4 (L) 2019    HCT 35.7 2019     2019     2019    POTASSIUM 3.6 2019    CHLORIDE 106 2019    CO2 26 2019    BUN 19 2019    CR 1.19 2019     2019    JONN 8.6 2019     "ALBUMIN 4.0 11/09/2018    PROTTOTAL 6.9 11/09/2018    ALT 12 11/09/2018    AST 17 11/09/2018    ALKPHOS 63 11/09/2018    BILITOTAL 0.5 11/09/2018       Preop Vitals  BP Readings from Last 3 Encounters:   01/29/19 149/58   11/21/18 122/82   11/10/18 134/63    Pulse Readings from Last 3 Encounters:   01/29/19 69   11/21/18 64   11/09/18 79      Resp Readings from Last 3 Encounters:   01/29/19 16   11/10/18 17   11/22/17 20    SpO2 Readings from Last 3 Encounters:   01/29/19 96%   11/10/18 96%      Temp Readings from Last 1 Encounters:   01/29/19 96.7  F (35.9  C) (Tympanic)    Ht Readings from Last 1 Encounters:   01/28/19 1.549 m (5' 1\")      Wt Readings from Last 1 Encounters:   01/29/19 100.8 kg (222 lb 3.6 oz)    Estimated body mass index is 41.99 kg/m  as calculated from the following:    Height as of this encounter: 1.549 m (5' 1\").    Weight as of this encounter: 100.8 kg (222 lb 3.6 oz).       Anesthesia Plan      History & Physical Review      ASA Status:  2 .    NPO Status:  > 6 hours    Plan for General and LMA with Propofol induction. Maintenance will be Balanced.    PONV prophylaxis:  Ondansetron (or other 5HT-3) and Dexamethasone or Solumedrol       Postoperative Care      Consents  Anesthetic plan, risks, benefits and alternatives discussed with:  Patient..                 SCOTT BRIDGES CRNA  "

## 2019-01-29 NOTE — PLAN OF CARE
Patient had low grade temp 100.0, gave PRN tylenol, see MAR. Blood pressure 140's systolic, HR regular. Staff straining patient's urine, no stones present, straw colored, sediment noted. Patient denies any pain, NPO since midnight, will continue to monitor. Babar Baron RN on 1/29/2019 at 4:42 AM

## 2019-01-30 LAB
BACTERIA SPEC CULT: NORMAL
SPECIMEN SOURCE: NORMAL

## 2019-01-30 NOTE — PHARMACY - DISCHARGE MEDICATION RECONCILIATION AND EDUCATION
Pharmacy: Discharge Counseling and Medication Reconciliation    Diane Ricardo  [address]  [place] [address]  [phone] (home)   73 year old female  PCP:Savita Tariq    No Known Allergies    Discharge Counseling:    Pharmacist met with patient (and/or family) today to review the medication portion of the After Visit Summary (with an emphasis on NEW medications) and to address patient's questions/concerns.     Summary of Education:   Met with patient at time of discharge to review all changes in medications including new oxycodone/acetaminophen. Discussed indications, directions for use, and possible side effects. Patient asked a few questions and all concerns were addressed.     Materials Provided:   MedCounselor sheets printed from Clinical Pharmacology on: oxycodone/acetaminophen    Discharge Medication Reconciliation:    Chana Maloney has reviewed the patient's discharge medication orders and has compared them to the inpatient medication administration record and to what the patient was taking prior to admission- any discrepancies have been resolved.     It has been determined that the patient has an adequate supply of medications available or which can be obtained from the patient's preferred pharmacy, which has been confirmed as: hard copy of prescription to bring to pharmacy of choice.     Thank you for the consult.     Chana Maloney ....................  1/29/2019   6:28 PM

## 2019-02-02 LAB
APPEARANCE STONE: NORMAL
COMPN STONE: NORMAL
NUMBER STONE: 1
SIZE STONE: NORMAL MM
WT STONE: 8 MG

## 2019-02-07 ENCOUNTER — OFFICE VISIT (OUTPATIENT)
Dept: UROLOGY | Facility: OTHER | Age: 74
End: 2019-02-07
Attending: UROLOGY
Payer: COMMERCIAL

## 2019-02-07 VITALS — WEIGHT: 221.2 LBS | HEART RATE: 68 BPM | RESPIRATION RATE: 16 BRPM | BODY MASS INDEX: 41.8 KG/M2

## 2019-02-07 DIAGNOSIS — N20.0 KIDNEY STONES: Primary | ICD-10-CM

## 2019-02-07 PROCEDURE — G0463 HOSPITAL OUTPT CLINIC VISIT: HCPCS | Mod: 25

## 2019-02-07 PROCEDURE — 52310 CYSTOSCOPY AND TREATMENT: CPT | Performed by: UROLOGY

## 2019-02-07 ASSESSMENT — PAIN SCALES - GENERAL: PAINLEVEL: NO PAIN (0)

## 2019-02-07 NOTE — PROGRESS NOTES
Preoperative diagnosis  Nephrolithiasis    Postoperative diagnosis  Nephrolithiasis    Procedure  Flexible cystourethroscopy with stent removal    Surgeon  Jason Yepez MD    Anesthesia  2% lidocaine jelly intraurethrally    Complications  None    Indications  73 year old female who is status post ureteroscopy with holmium laser lithotripsy who presents for stent removal.    Procedure  The patient was given one dose of antibiotics. The patient was placed in supine position and was prepped and draped in sterile fashion.  2% lidocaine jelly was bluntly injected per urethra without difficulty. I passed the 14 French flexible cystoscope through the urethra and into the bladder.  With the aid of a stent grasper I grasped and removed the stent in its entirety.  The patient tolerated the procedure well.    Plan  Discussed generic dietary approach to stone prevention- provided hand out  F/u as needed

## 2019-02-07 NOTE — NURSING NOTE
Patient positioned in frog leg position prior to Jason Yepez MD prepping patient with chlorhexidine gluconate cleanser.    [facility] Protocol    A. Pre-procedure verification complete Yes   1-relevant information / documentation available, reviewed and properly matched to the patient; 2-consent accurate and complete, 3-equipment and supplies available    B. Site marking complete N/A  Site marked if not in continuous attendance with patient    C. TIME OUT completed Yes  Time Out was conducted just prior to starting procedure to verify the eight required elements: 1-patient identity, 2-consent accurate and complete, 3-position, 4-correct side/site marked (if applicable), 5-procedure, 6-relevant images / results properly labeled and displayed (if applicable), 7-antibiotics / irrigation fluids (if applicable), 8-safety precautions.    After procedure perineum area rinsed. Discharge instructions reviewed with patient. Patient verbalized understanding of discharge instructions and discharged ambulatory.  Shamika Escobar..................2/7/2019  8:26 AM

## 2019-02-07 NOTE — NURSING NOTE
Cefuroxime 500mg tablet (2-250mg tablets with different lot numbers and expiration dates) by mouth one time now ordered by Jason Yepez MD.  Medication administered per verbal order   Lot # [id number]  Exp. 11/30/19    Lot # [id number]  Exp. 12/11/2019    Patient tolerated well.  Shamika Escobar RN......February 7, 2019...8:07 AM

## 2019-02-07 NOTE — PATIENT INSTRUCTIONS
Home Care after Cystoscopy  Follow these guidelines for your care after your procedure.    Activity  No limitations    Bathing or showering  No limitations    Symptoms  You may notice some burning with urination but this usually resolves after 1-2 days.  You may also notice small amounts of blood in your urine.  Please increase water intake for the next few days to help with these symptoms.    Contacts  General Questions: [phone]  Appointments:  [phone]  Emergencies:  [phone]    When to call the clinic  If you develop any of the following symptoms please call the clinic immediately.  If the clinic is closed please be seen at an urgent care clinic or the Emergency Department.  - Burning with urination that worsens after 2 days  - Unable to urinate causing severe pelvic pain  - Fevers of greater than 101 degrees F  - Flank pain that is not responding to pain medication    Follow up  Please follow up as discussed at the appointment.        Please follow the below generic recommendations to help prevent stones.    If you are interested in undergoing a work up (including blood and urine tests) to determine your patient specific factors for why you form stones and ways to specifically prevent stones in the future, ask Dr Yepez if he hasn't already discussed this with you.      Fluids (Increase)  Please increase your fluid intake   Recommend about ten 10-ounce glasses of fluid per day (avoid dark olivia).  Please try and keep your urine clear or pale yellow.    If it is dark yellow or cloudy it is concentrated and you need to drink more fluid.  Try drinking a tall glass of water prior to every snack/meal.    Citrate (Increase)  Citrate prevents stone formation and is naturally found in urine.    It can be increased by adding concentrated lemon juice (4 ounces daily) and/or drinking diluted orange juice (50/50 with water).    Both MinuteMaid Light and Crystal Light also contain citrate and can be helpful for stone  prevention.    If you plan to drink sodas, I would recommend Diet/Sunkist and/or Diet/7UP as they contain the most citrate (which is good) compared to the olivia such as Coke/Pepsi.      Salt (Decrease)  Try to limit salt intake.  Total daily sodium intake should be less than 1500mg.  If you use salt with cooking don't add any additional salt at the table.    Protein  Limit protein intake to small to moderate portions.  For instance, a steak should be no larger than about the size of a deck of cards.  High protein intake leads to stone formation.        Component Value Flag Ref Range Units Status Collected Lab   Stone Composition SEE NOTE     Final 01/29/2019  2:46 PM [facility]   Comment:   (Note)   Calculi composed primarily of:   80% calcium oxalate monohydrate, and   20% calcium oxalate dihydrate.

## 2019-04-19 ENCOUNTER — DOCUMENTATION ONLY (OUTPATIENT)
Dept: OTHER | Facility: CLINIC | Age: 74
End: 2019-04-19

## 2019-09-27 ENCOUNTER — DOCUMENTATION ONLY (OUTPATIENT)
Dept: OTHER | Facility: CLINIC | Age: 74
End: 2019-09-27

## 2019-10-01 ENCOUNTER — ALLIED HEALTH/NURSE VISIT (OUTPATIENT)
Dept: FAMILY MEDICINE | Facility: OTHER | Age: 74
End: 2019-10-01
Payer: COMMERCIAL

## 2019-10-01 DIAGNOSIS — Z23 NEED FOR PROPHYLACTIC VACCINATION AND INOCULATION AGAINST INFLUENZA: Primary | ICD-10-CM

## 2019-10-01 PROCEDURE — 90662 IIV NO PRSV INCREASED AG IM: CPT

## 2019-10-01 PROCEDURE — 90471 IMMUNIZATION ADMIN: CPT

## 2019-12-02 ENCOUNTER — HOSPITAL ENCOUNTER (OUTPATIENT)
Dept: MAMMOGRAPHY | Facility: OTHER | Age: 74
Discharge: HOME | End: 2019-12-02
Attending: INTERNAL MEDICINE | Admitting: INTERNAL MEDICINE
Payer: COMMERCIAL

## 2019-12-02 DIAGNOSIS — Z12.31 VISIT FOR SCREENING MAMMOGRAM: ICD-10-CM

## 2019-12-02 DIAGNOSIS — Z12.39 BREAST SCREENING: ICD-10-CM

## 2019-12-02 PROCEDURE — 77063 BREAST TOMOSYNTHESIS BI: CPT

## 2019-12-16 ENCOUNTER — OFFICE VISIT (OUTPATIENT)
Dept: INTERNAL MEDICINE | Facility: OTHER | Age: 74
End: 2019-12-16
Attending: INTERNAL MEDICINE
Payer: COMMERCIAL

## 2019-12-16 VITALS
TEMPERATURE: 97.3 F | HEART RATE: 76 BPM | WEIGHT: 220.8 LBS | DIASTOLIC BLOOD PRESSURE: 72 MMHG | HEIGHT: 61 IN | BODY MASS INDEX: 41.69 KG/M2 | RESPIRATION RATE: 18 BRPM | SYSTOLIC BLOOD PRESSURE: 128 MMHG | OXYGEN SATURATION: 96 %

## 2019-12-16 DIAGNOSIS — S46.819A STRAIN OF TRAPEZIUS MUSCLE, UNSPECIFIED LATERALITY, INITIAL ENCOUNTER: ICD-10-CM

## 2019-12-16 DIAGNOSIS — L60.0 INGROWN RIGHT BIG TOENAIL: ICD-10-CM

## 2019-12-16 DIAGNOSIS — R09.82 PND (POST-NASAL DRIP): ICD-10-CM

## 2019-12-16 DIAGNOSIS — R73.9 ELEVATED BLOOD SUGAR: ICD-10-CM

## 2019-12-16 DIAGNOSIS — H61.21 IMPACTED CERUMEN OF RIGHT EAR: ICD-10-CM

## 2019-12-16 DIAGNOSIS — Z79.899 HIGH RISK MEDICATION USE: ICD-10-CM

## 2019-12-16 DIAGNOSIS — E78.00 PURE HYPERCHOLESTEROLEMIA: Primary | ICD-10-CM

## 2019-12-16 DIAGNOSIS — Z13.1 SCREENING FOR DIABETES MELLITUS: ICD-10-CM

## 2019-12-16 PROBLEM — R03.0 ELEVATED BLOOD PRESSURE READING WITHOUT DIAGNOSIS OF HYPERTENSION: Status: RESOLVED | Noted: 2018-02-26 | Resolved: 2019-12-16

## 2019-12-16 LAB
ALBUMIN SERPL-MCNC: 4.6 G/DL (ref 3.5–5.7)
ALP SERPL-CCNC: 70 U/L (ref 34–104)
ALT SERPL W P-5'-P-CCNC: 16 U/L (ref 7–52)
ANION GAP SERPL CALCULATED.3IONS-SCNC: 12 MMOL/L (ref 3–14)
AST SERPL W P-5'-P-CCNC: 27 U/L (ref 13–39)
BILIRUB SERPL-MCNC: 0.5 MG/DL (ref 0.3–1)
BUN SERPL-MCNC: 21 MG/DL (ref 7–25)
CALCIUM SERPL-MCNC: 9.7 MG/DL (ref 8.6–10.3)
CHLORIDE SERPL-SCNC: 106 MMOL/L (ref 98–107)
CHOLEST SERPL-MCNC: 185 MG/DL
CO2 SERPL-SCNC: 24 MMOL/L (ref 21–31)
CREAT SERPL-MCNC: 0.79 MG/DL (ref 0.6–1.2)
ERYTHROCYTE [DISTWIDTH] IN BLOOD BY AUTOMATED COUNT: 13.1 % (ref 10–15)
GFR SERPL CREATININE-BSD FRML MDRD: 71 ML/MIN/{1.73_M2}
GLUCOSE SERPL-MCNC: 128 MG/DL (ref 70–105)
HBA1C MFR BLD: 5.3 % (ref 4–6)
HCT VFR BLD AUTO: 43.3 % (ref 35–47)
HDLC SERPL-MCNC: 52 MG/DL (ref 23–92)
HGB BLD-MCNC: 14.5 G/DL (ref 11.7–15.7)
LDLC SERPL CALC-MCNC: 103 MG/DL
MCH RBC QN AUTO: 29.9 PG (ref 26.5–33)
MCHC RBC AUTO-ENTMCNC: 33.5 G/DL (ref 31.5–36.5)
MCV RBC AUTO: 89 FL (ref 78–100)
NONHDLC SERPL-MCNC: 133 MG/DL
PLATELET # BLD AUTO: 242 10E9/L (ref 150–450)
POTASSIUM SERPL-SCNC: 4.4 MMOL/L (ref 3.5–5.1)
PROT SERPL-MCNC: 7.7 G/DL (ref 6.4–8.9)
RBC # BLD AUTO: 4.85 10E12/L (ref 3.8–5.2)
SODIUM SERPL-SCNC: 142 MMOL/L (ref 134–144)
TRIGL SERPL-MCNC: 149 MG/DL
WBC # BLD AUTO: 6.7 10E9/L (ref 4–11)

## 2019-12-16 PROCEDURE — G0439 PPPS, SUBSEQ VISIT: HCPCS | Mod: 25 | Performed by: INTERNAL MEDICINE

## 2019-12-16 PROCEDURE — 99214 OFFICE O/P EST MOD 30 MIN: CPT | Mod: 25 | Performed by: INTERNAL MEDICINE

## 2019-12-16 PROCEDURE — G0463 HOSPITAL OUTPT CLINIC VISIT: HCPCS

## 2019-12-16 PROCEDURE — 80061 LIPID PANEL: CPT | Mod: ZL | Performed by: INTERNAL MEDICINE

## 2019-12-16 PROCEDURE — 85027 COMPLETE CBC AUTOMATED: CPT | Mod: ZL | Performed by: INTERNAL MEDICINE

## 2019-12-16 PROCEDURE — 80053 COMPREHEN METABOLIC PANEL: CPT | Mod: ZL | Performed by: INTERNAL MEDICINE

## 2019-12-16 PROCEDURE — 36415 COLL VENOUS BLD VENIPUNCTURE: CPT | Mod: ZL | Performed by: INTERNAL MEDICINE

## 2019-12-16 PROCEDURE — 69210 REMOVE IMPACTED EAR WAX UNI: CPT | Performed by: INTERNAL MEDICINE

## 2019-12-16 PROCEDURE — G0463 HOSPITAL OUTPT CLINIC VISIT: HCPCS | Mod: 25

## 2019-12-16 PROCEDURE — 83036 HEMOGLOBIN GLYCOSYLATED A1C: CPT | Mod: ZL | Performed by: INTERNAL MEDICINE

## 2019-12-16 RX ORDER — LOVASTATIN 20 MG
20 TABLET ORAL AT BEDTIME
Qty: 90 TABLET | Refills: 4 | Status: SHIPPED | OUTPATIENT
Start: 2019-12-16 | End: 2020-12-28

## 2019-12-16 ASSESSMENT — PATIENT HEALTH QUESTIONNAIRE - PHQ9: SUM OF ALL RESPONSES TO PHQ QUESTIONS 1-9: 4

## 2019-12-16 ASSESSMENT — PAIN SCALES - GENERAL: PAINLEVEL: MILD PAIN (2)

## 2019-12-16 ASSESSMENT — MIFFLIN-ST. JEOR: SCORE: 1430.98

## 2019-12-16 NOTE — PATIENT INSTRUCTIONS
For your sinus drainage, please try one of the following:  - Loratadine (Claritin) 10mg daily for 3-4 weeks and then as needed (least sedating, least effective)  - Fexofenadine (Allegra) 180mg daily for 3-4 weeks and then as needed   - Cetirizine (Zyrtec) 10mg daily for 3-4 weeks and then as needed (most sedating,most effective)    Please note that these can take up to 3-4 weeks to see a difference.    -------------------------------------------------------------------------------    Ice every 3-4 hours, gentle exercise/rest as much as possible.    Recommend Ibuprofen 200 mg tablet (3 tablets) 3 times daily for 3-5 days and then as needed.  Be sure to take with food.  Maximum 16 per day.    Caution with NSAIDS (ibuprofen, aspirin, naproxen, aleve, advil) due to risk for increased blood pressure,stomach pain/nausea/ulcers and kidney damage; use minimal amount necessary    -- in addition to --     Tylenol 1000mg (2- extra strength 500mg tablets or 3 regular strength 325mg tablets) three times a day; Maximum of 4000mg daily    - Return/call as needed for follow-up should any new symptoms develop, for worsening of current symptoms or if symptoms do notresolve with above plan.

## 2019-12-16 NOTE — PROGRESS NOTES
"     Medicare Wellness Visit   Ms. Ricardo is a 74 year old female who presents today who presents for Preventive Visit.      Are you in the first 12 months of your Medicare coverage?  No      Health Risk Assessment     Do you feel safe in your environment? Yes    Physical Health:    In general, how would you rate your overall physical health? good    Outside of work, how many days during the week do you exercise? none    Outside of work, approximately how many minutes a day do you exercise?not applicable    If you drink alcohol do you typically have >3 drinks per day or >7 drinks per week? No    Do you usually eat at least 4 servings of fruit and vegetables a day, include whole grains & fiber and avoid regularly eating high fat or \"junk\" foods? NO    Do you have any problems taking medications regularly?  No    Do you have any side effects from medications? not applicable    Needs assistance for the following daily activities: no assistance needed    Which of the following safety concerns are present in your home?  throw rugs in the hallway and lack of grab bars in the bathroom     Hearing impairment: No    In the past 6 months, have you been bothered by leaking of urine? no      Screening       Fall risk  Fallen 2 or more times in the past year?: No  Any fall with injury in the past year?: No    Cognitive Screening   1) Repeat 3 items (Leader, Season, Table)    2) Clock draw: NORMAL  3) 3 item recall: Recalls 3 objects  Results: 3 items recalled: COGNITIVE IMPAIRMENT LESS LIKELY    Mini-CogTM Copyright RADHA Blanton. Licensed by the author for use in [facility]; reprinted with permission (jazzmine@.[facility]). All rights reserved.      Do you have sleep apnea, excessive snoring or daytime drowsiness?: no      Medical Record Update       Reviewed and updated as needed this visit by clinical staff  Tobacco  Allergies  Meds  Med Hx  Surg Hx  Fam Hx  Soc Hx        Reviewed and updated as needed this visit by " "Provider  Tobacco  Allergies  Meds  Med Hx  Surg Hx  Fam Hx  Soc Hx          Current providers sharing in care for this patient include:   Patient Care Team:  Savita Tariq DO as PCP - General (Internal Medicine)     Subjective:     Patient has a history of hyperlipidemia.  She is on lovastatin.  She denies any side effects and needs a refill today.    She has had ongoing issues with an ingrown toenail on the right big toe.  She has used some antibiotic ointment and a Band-Aid which helps at times.  She is curious what other options are available.    She has been having some plugging in her ears and also noticed some postnasal drip.  She is not currently taking any over-the-counter medications.    She has had a stressful year.  Her   last .  She has been doing increased house cares and maintenance since then.  She has carried a lot of stress through her back and is having somewhat sore shoulders from this.  She denies any significant injury or decrease in mobility.  She has been using Advil up to 600 mg daily.  She denies any side effects of this.      Review of Systems  GEN: -fevers/-chills/-night sweats/-wt change  NOSE: +drainage/+congestion  MOUTH/THROAT: - sore throat/-dysphagia/-sores  LUNGS: -sob/-cough  CARDIOVASCULAR: -cp/-palpitations  ABD: -pain/-change in bowels/-bloody stools  : -change in bladder/-sores/-vaginal discharge or bleeding  HEMATOLOGIC/LYMPHATIC: -swollen nodes  SKIN: -rashes/-lesions  MSK/RHEUM: +joint pain/-swelling  PSYCH: -anhedonia/-depression/-anxiety        OBJECTIVE:   /72 (BP Location: Right arm, Patient Position: Sitting, Cuff Size: Adult Large)   Pulse 76   Temp 97.3  F (36.3  C) (Tympanic)   Resp 18   Ht 1.537 m (5' 0.5\")   Wt 100.2 kg (220 lb 12.8 oz)   SpO2 96%   Breastfeeding No   BMI 42.41 kg/m      Estimated body mass index is 42.41 kg/m  as calculated from the following:    Height as of this encounter: 1.537 m (5' 0.5\").    Weight as of " this encounter: 100.2 kg (220 lb 12.8 oz).     Physical Exam  GENERAL APPEARANCE: healthy, alert and no distress  EYES: Eyes grossly normal to inspection, PERRL and conjunctivae and sclerae normal  HENT: mouth without ulcers or lesions, oropharynx clear and oral mucous membranes moist; EAC with cerumen on the right, unable to see TM.  Left tympanic membrane gray with slight dullness.  No significant cerumen noted in the external auditory canal on the left.  NECK: no adenopathy, no asymmetry, masses, or scars and thyroid normal to palpation  RESP: lungs clear to auscultation - no rales, rhonchi or wheezes  CV: regular rate and rhythm, normal S1 S2, no S3 or S4, no murmur, click or rub, no peripheral edema and peripheral pulses strong  ABDOMEN: Obese, nondistended  MS: no musculoskeletal defects are noted and gait is age appropriate without ataxia; she does have is significant hypertrophy and tightness of the trapezius which is tender to palpation  SKIN: no suspicious lesions or rashes  EXT: Right big toe with slight tenderness to palpation along the medial aspect.  No significant swelling or erythema is present at this time.  NEURO: Normal strength and tone, sensory exam grossly normal, mentation intact and speech normal  PSYCH: mentation appears normal and affect normal/bright    Labs reviewed in EPIC    ASSESSMENT / PLAN:   1. Pure hypercholesterolemia  - Stable at this time.  Continue current medication.  - lovastatin (MEVACOR) 20 MG tablet; Take 1 tablet (20 mg) by mouth At Bedtime  Dispense: 90 tablet; Refill: 4  - Lipid Panel    2. Screening for diabetes mellitus  - Comprehensive Metabolic Panel    3. Strain of trapezius muscle, unspecified laterality, initial encounter  - Ice, elevation, gentle movement/rest as tolerated  - Ibuprofen, Naproxen or Tylenol as needed  - offered PT ,patient is to call if interested in.  Exercises given in the meantime.  - patient is to call if she has additional problems with this  or if new symptoms develop    4. Ingrown right big toenail  -At this time she was instructed on cutting her toenails straight across.  She can try some foot soaks.  If at any point she is interested in nail removal we will set her up with someone who can complete this.  She is to call with any further issues.    5. PND (post-nasal drip)  - conservative measures with OTC antihistamine as in patient instructions  - Return/call as needed for follow-up should any new symptoms develop, for worsening of current symptoms or if symptoms do not resolve with above plan.      6. High risk medication use  - CBC W PLT No Diff    7. Elevated blood sugar  -Blood sugar slightly elevated.  A1c added on and normal.  Encouraged to work on diet and activity level today.  Continue to monitor.  - Hemoglobin A1c    8. Impacted cerumen of right ear  -Cerumen removed by myself with loop curette with no complications.  - REMOVE IMPACTED CERUMEN        End of Life Planning     Do you have a Health Care Directive? Yes, advance care planning is on file.    End of Life Planning:  Patient currently has an advanced directive: Yes.  Practitioner is supportive of decision.    Preventative Care Guidelines and Health Counseling     COUNSELING:  Reviewed preventive health counseling, as reflected in patient instructions  Special attention given to: Immunizations, use over-the-counter medications, diabetes screening, diet and exercise    128/72   BP Screening:   Last 3 BP Readings:    BP Readings from Last 3 Encounters:   12/16/19 128/72   01/29/19 127/74   11/21/18 122/82       The following was recommended to the patient:  Re-screen BP within a year and recommended lifestyle modifications    Body mass index is 42.41 kg/m . Weight management plan: Discussed healthy diet and exercise guidelines        Appropriate preventive services were discussed with this patient, including applicable screening as appropriate for cardiovascular disease, diabetes,  osteopenia/osteoporosis, and glaucoma.  As appropriate for age/gender, discussed screening for colorectal cancer, prostate cancer, breast cancer, and cervical cancer. Checklist reviewing preventive services available has been given to the patient.    Reviewed patients plan of care and provided an AVS. The Basic Care Plan (routine screening as documented in Health Maintenance) for patient meets the Care Plan requirement. This Care Plan has been established and reviewed with the Patient and any available family members.    Counseling Resources:  ATP IV Guidelines  Pooled Cohorts Equation Calculator  Breast Cancer Risk Calculator  FRAX Risk Assessment  ICSI Preventive Guidelines  Dietary Guidelines for Americans, 2010  USDA's MyPlate  ASA Prophylaxis  Lung CA Screening    Savita Tariq DO  12/16/2019  10:13 AM  [facility]

## 2019-12-16 NOTE — LETTER
December 18, 2019      Diane M Davion  [address]  [place] [address]        Dear ,    We are writing to inform you of your test results.    Your test results fall within the expected range(s) or remain unchanged from previous results.  Please continue with current treatment plan.    Resulted Orders   CBC W PLT No Diff   Result Value Ref Range    WBC 6.7 4.0 - 11.0 10e9/L    RBC Count 4.85 3.8 - 5.2 10e12/L    Hemoglobin 14.5 11.7 - 15.7 g/dL    Hematocrit 43.3 35.0 - 47.0 %    MCV 89 78 - 100 fl    MCH 29.9 26.5 - 33.0 pg    MCHC 33.5 31.5 - 36.5 g/dL    RDW 13.1 10.0 - 15.0 %    Platelet Count 242 150 - 450 10e9/L   Comprehensive Metabolic Panel   Result Value Ref Range    Sodium 142 134 - 144 mmol/L    Potassium 4.4 3.5 - 5.1 mmol/L    Chloride 106 98 - 107 mmol/L    Carbon Dioxide 24 21 - 31 mmol/L    Anion Gap 12 3 - 14 mmol/L    Glucose 128 (H) 70 - 105 mg/dL    Urea Nitrogen 21 7 - 25 mg/dL    Creatinine 0.79 0.60 - 1.20 mg/dL    GFR Estimate 71 >60 mL/min/[1.73_m2]    GFR Estimate If Black 86 >60 mL/min/[1.73_m2]    Calcium 9.7 8.6 - 10.3 mg/dL    Bilirubin Total 0.5 0.3 - 1.0 mg/dL    Albumin 4.6 3.5 - 5.7 g/dL    Protein Total 7.7 6.4 - 8.9 g/dL    Alkaline Phosphatase 70 34 - 104 U/L    ALT 16 7 - 52 U/L    AST 27 13 - 39 U/L   Lipid Panel   Result Value Ref Range    Cholesterol 185 <200 mg/dL    Triglycerides 149 <150 mg/dL    HDL Cholesterol 52 23 - 92 mg/dL    LDL Cholesterol Calculated 103 (H) <100 mg/dL      Comment:      Above desirable:  100-129 mg/dl  Borderline High:  130-159 mg/dL  High:             160-189 mg/dL  Very high:       >189 mg/dl      Non HDL Cholesterol 133 (H) <130 mg/dL      Comment:      Above Desirable:  130-159 mg/dl  Borderline high:  160-189 mg/dl  High:             190-219 mg/dl  Very high:       >219 mg/dl     Hemoglobin A1c   Result Value Ref Range    Hemoglobin A1C 5.3 4.0 - 6.0 %       If you have any questions or concerns, please call the clinic  at the number listed above.       Sincerely,        Savita Tariq, DO

## 2019-12-16 NOTE — NURSING NOTE
Patient presents to the clinic for annual visit, medication discussion and review.     Medication Reconciliation: complete   Love Howell LPN............. December 16, 2019 9:44 AM

## 2020-01-06 LAB — COLOGUARD-ABSTRACT: NEGATIVE

## 2020-10-06 ENCOUNTER — ALLIED HEALTH/NURSE VISIT (OUTPATIENT)
Dept: FAMILY MEDICINE | Facility: OTHER | Age: 75
End: 2020-10-06
Attending: INTERNAL MEDICINE
Payer: COMMERCIAL

## 2020-10-06 DIAGNOSIS — Z23 NEED FOR PROPHYLACTIC VACCINATION AND INOCULATION AGAINST INFLUENZA: Primary | ICD-10-CM

## 2020-10-06 PROCEDURE — 90662 IIV NO PRSV INCREASED AG IM: CPT

## 2020-10-06 NOTE — NURSING NOTE
Immunization Documentation    Prior to Immunization administration, verified patients identity using patient's name and date of birth. Please see IMMUNIZATIONS  and order for additional information.  Patient / Parent instructed to remain in clinic for 15 minutes and report any adverse reaction to staff immediately.    Was entire vial of medication used? Yes  Vial/Syringe: Nat Calzada CMA  10/6/2020   4:21 PM

## 2020-12-28 ENCOUNTER — OFFICE VISIT (OUTPATIENT)
Dept: INTERNAL MEDICINE | Facility: OTHER | Age: 75
End: 2020-12-28
Attending: INTERNAL MEDICINE
Payer: COMMERCIAL

## 2020-12-28 ENCOUNTER — HOSPITAL ENCOUNTER (OUTPATIENT)
Dept: MAMMOGRAPHY | Facility: OTHER | Age: 75
End: 2020-12-28
Attending: INTERNAL MEDICINE
Payer: COMMERCIAL

## 2020-12-28 VITALS
HEART RATE: 68 BPM | OXYGEN SATURATION: 98 % | HEIGHT: 60 IN | TEMPERATURE: 97.7 F | WEIGHT: 232 LBS | RESPIRATION RATE: 16 BRPM | SYSTOLIC BLOOD PRESSURE: 124 MMHG | DIASTOLIC BLOOD PRESSURE: 84 MMHG | BODY MASS INDEX: 45.55 KG/M2

## 2020-12-28 DIAGNOSIS — E66.01 MORBID OBESITY (H): ICD-10-CM

## 2020-12-28 DIAGNOSIS — Z01.818 PREOPERATIVE EXAMINATION: Primary | ICD-10-CM

## 2020-12-28 DIAGNOSIS — Z12.31 VISIT FOR SCREENING MAMMOGRAM: ICD-10-CM

## 2020-12-28 DIAGNOSIS — E78.00 PURE HYPERCHOLESTEROLEMIA: ICD-10-CM

## 2020-12-28 DIAGNOSIS — R73.03 PREDIABETES: ICD-10-CM

## 2020-12-28 LAB
ALBUMIN SERPL-MCNC: 4.1 G/DL (ref 3.5–5.7)
ALP SERPL-CCNC: 65 U/L (ref 34–104)
ALT SERPL W P-5'-P-CCNC: 15 U/L (ref 7–52)
ANION GAP SERPL CALCULATED.3IONS-SCNC: 9 MMOL/L (ref 3–14)
AST SERPL W P-5'-P-CCNC: 20 U/L (ref 13–39)
BILIRUB SERPL-MCNC: 0.5 MG/DL (ref 0.3–1)
BUN SERPL-MCNC: 13 MG/DL (ref 7–25)
CALCIUM SERPL-MCNC: 9.2 MG/DL (ref 8.6–10.3)
CHLORIDE SERPL-SCNC: 106 MMOL/L (ref 98–107)
CHOLEST SERPL-MCNC: 187 MG/DL
CO2 SERPL-SCNC: 26 MMOL/L (ref 21–31)
CREAT SERPL-MCNC: 0.76 MG/DL (ref 0.6–1.2)
ERYTHROCYTE [DISTWIDTH] IN BLOOD BY AUTOMATED COUNT: 13.4 % (ref 10–15)
GFR SERPL CREATININE-BSD FRML MDRD: 74 ML/MIN/{1.73_M2}
GLUCOSE SERPL-MCNC: 117 MG/DL (ref 70–105)
HBA1C MFR BLD: 5.4 % (ref 4–6)
HCT VFR BLD AUTO: 42.2 % (ref 35–47)
HDLC SERPL-MCNC: 46 MG/DL (ref 23–92)
HGB BLD-MCNC: 13.8 G/DL (ref 11.7–15.7)
LDLC SERPL CALC-MCNC: 104 MG/DL
MCH RBC QN AUTO: 29.2 PG (ref 26.5–33)
MCHC RBC AUTO-ENTMCNC: 32.7 G/DL (ref 31.5–36.5)
MCV RBC AUTO: 89 FL (ref 78–100)
NONHDLC SERPL-MCNC: 141 MG/DL
PLATELET # BLD AUTO: 235 10E9/L (ref 150–450)
POTASSIUM SERPL-SCNC: 4.2 MMOL/L (ref 3.5–5.1)
PROT SERPL-MCNC: 6.7 G/DL (ref 6.4–8.9)
RBC # BLD AUTO: 4.72 10E12/L (ref 3.8–5.2)
SODIUM SERPL-SCNC: 141 MMOL/L (ref 134–144)
TRIGL SERPL-MCNC: 184 MG/DL
WBC # BLD AUTO: 6.3 10E9/L (ref 4–11)

## 2020-12-28 PROCEDURE — 80053 COMPREHEN METABOLIC PANEL: CPT | Mod: ZL | Performed by: INTERNAL MEDICINE

## 2020-12-28 PROCEDURE — 80061 LIPID PANEL: CPT | Mod: ZL | Performed by: INTERNAL MEDICINE

## 2020-12-28 PROCEDURE — 85027 COMPLETE CBC AUTOMATED: CPT | Mod: ZL | Performed by: INTERNAL MEDICINE

## 2020-12-28 PROCEDURE — 99214 OFFICE O/P EST MOD 30 MIN: CPT | Performed by: INTERNAL MEDICINE

## 2020-12-28 PROCEDURE — 77067 SCR MAMMO BI INCL CAD: CPT

## 2020-12-28 PROCEDURE — 36415 COLL VENOUS BLD VENIPUNCTURE: CPT | Mod: ZL | Performed by: INTERNAL MEDICINE

## 2020-12-28 PROCEDURE — 83036 HEMOGLOBIN GLYCOSYLATED A1C: CPT | Mod: ZL | Performed by: INTERNAL MEDICINE

## 2020-12-28 PROCEDURE — G0463 HOSPITAL OUTPT CLINIC VISIT: HCPCS | Performed by: INTERNAL MEDICINE

## 2020-12-28 RX ORDER — KETOROLAC TROMETHAMINE 5 MG/ML
SOLUTION OPHTHALMIC
COMMUNITY
Start: 2021-01-09 | End: 2021-01-27

## 2020-12-28 RX ORDER — MOXIFLOXACIN 5 MG/ML
SOLUTION/ DROPS OPHTHALMIC
COMMUNITY
Start: 2021-01-09 | End: 2021-01-27

## 2020-12-28 RX ORDER — LOVASTATIN 20 MG
20 TABLET ORAL AT BEDTIME
Qty: 90 TABLET | Refills: 4 | Status: SHIPPED | OUTPATIENT
Start: 2020-12-28

## 2020-12-28 RX ORDER — PREDNISOLONE ACETATE 10 MG/ML
SUSPENSION/ DROPS OPHTHALMIC
COMMUNITY
Start: 2021-01-09 | End: 2021-01-27

## 2020-12-28 ASSESSMENT — MIFFLIN-ST. JEOR: SCORE: 1468.85

## 2020-12-28 ASSESSMENT — PAIN SCALES - GENERAL: PAINLEVEL: MILD PAIN (2)

## 2020-12-28 NOTE — LETTER
December 31, 2020      Diane Ricardo  [address]  [place] [address]        Dear ,    We are writing to inform you of your test results.    Your test results fall within the expected range(s) or remain unchanged from previous results.  Please continue with current treatment plan.    Resulted Orders   Hemoglobin A1c   Result Value Ref Range    Hemoglobin A1C 5.4 4.0 - 6.0 %   Lipid Profile   Result Value Ref Range    Cholesterol 187 <200 mg/dL    Triglycerides 184 (H) <150 mg/dL      Comment:      Borderline high:  150-199 mg/dl  High:             200-499 mg/dl  Very high:       >499 mg/dl      HDL Cholesterol 46 23 - 92 mg/dL    LDL Cholesterol Calculated 104 (H) <100 mg/dL      Comment:      Above desirable:  100-129 mg/dl  Borderline High:  130-159 mg/dL  High:             160-189 mg/dL  Very high:       >189 mg/dl      Non HDL Cholesterol 141 (H) <130 mg/dL      Comment:      Above Desirable:  130-159 mg/dl  Borderline high:  160-189 mg/dl  High:             190-219 mg/dl  Very high:       >219 mg/dl     CBC with platelets   Result Value Ref Range    WBC 6.3 4.0 - 11.0 10e9/L    RBC Count 4.72 3.8 - 5.2 10e12/L    Hemoglobin 13.8 11.7 - 15.7 g/dL    Hematocrit 42.2 35.0 - 47.0 %    MCV 89 78 - 100 fl    MCH 29.2 26.5 - 33.0 pg    MCHC 32.7 31.5 - 36.5 g/dL    RDW 13.4 10.0 - 15.0 %    Platelet Count 235 150 - 450 10e9/L   Comprehensive metabolic panel   Result Value Ref Range    Sodium 141 134 - 144 mmol/L    Potassium 4.2 3.5 - 5.1 mmol/L    Chloride 106 98 - 107 mmol/L    Carbon Dioxide 26 21 - 31 mmol/L    Anion Gap 9 3 - 14 mmol/L    Glucose 117 (H) 70 - 105 mg/dL    Urea Nitrogen 13 7 - 25 mg/dL    Creatinine 0.76 0.60 - 1.20 mg/dL    GFR Estimate 74 >60 mL/min/[1.73_m2]    GFR Estimate If Black 90 >60 mL/min/[1.73_m2]    Calcium 9.2 8.6 - 10.3 mg/dL    Bilirubin Total 0.5 0.3 - 1.0 mg/dL    Albumin 4.1 3.5 - 5.7 g/dL    Protein Total 6.7 6.4 - 8.9 g/dL    Alkaline Phosphatase 65 34  - 104 U/L    ALT 15 7 - 52 U/L    AST 20 13 - 39 U/L       If you have any questions or concerns, please call the clinic at the number listed above.       Sincerely,      Savita Tariq, DO

## 2020-12-28 NOTE — NURSING NOTE
Patient presents to clinic today for preop for bilateral cataract surgery.  Medication reconciliation completed.  Norma Clements CMA([facility])..................12/28/2020   11:44 AM

## 2020-12-28 NOTE — PROGRESS NOTES
[facility]  [address]  [place] [address]  Phone: [phone]  Primary Provider: Molly Tariq  Pre-op Performing Provider: MOLLY TARIQ    PREOPERATIVE EVALUATION:  Today's date: 12/28/2020    Diane Ricardo is a 75 year old female who presents for a preoperative evaluation.    Surgical Information:  Surgery/Procedure: Bilateral Cataract (Left 1/12/21, Right 1/26/21)  Surgery Location: [facility]  Surgeon: Dr Hardeep Aceves  Surgery Date: 1/12/21 & 1/26/21  Time of Surgery: TBD  Where patient plans to recover: At home alone  Fax number for surgical facility: [phone]    Type of Anesthesia Anticipated: to be determined    Subjective     HPI related to upcoming procedure: No questions about surgery    Preop Questions 12/28/2020   1. Have you ever had a heart attack or stroke? No   2. Have you ever had surgery on your heart or blood vessels, such as a stent placement, a coronary artery bypass, or surgery on an artery in your head, neck, heart, or legs? No   3. Do you have chest pain with activity? No   4. Do you have a history of  heart failure? No   5. Do you currently have a cold, bronchitis or symptoms of other infection? No   6. Do you have a cough, shortness of breath, or wheezing? No   7. Do you or anyone in your family have previous history of blood clots? No   8. Do you or does anyone in your family have a serious bleeding problem such as prolonged bleeding following surgeries or cuts? No   9. Have you ever had problems with anemia or been told to take iron pills? No   10. Have you had any abnormal blood loss such as black, tarry or bloody stools, or abnormal vaginal bleeding? No   11. Have you ever had a blood transfusion? No   12. Are you willing to have a blood transfusion if it is medically needed before, during, or after your surgery? Yes   13. Have you or any of your relatives ever had problems with anesthesia? No   14. Do you have sleep apnea,  excessive snoring or daytime drowsiness? No   15. Do you have any artifical heart valves or other implanted medical devices like a pacemaker, defibrillator, or continuous glucose monitor? No   16. Do you have artificial joints? No   17. Are you allergic to latex? No       Health Care Directive:  Patient has a Health Care Directive on file      Preoperative Review of :   reviewed - no record of controlled substances prescribed.    Status of Chronic Conditions:  HYPERLIPIDEMIA - Patient has a long history of significant Hyperlipidemia requiring medication for treatment with recent good control. Patient reports no problems or side effects with the medication.     She has a history of obesity and has gained some weight this year secondary to changes in exercise and activity in the setting of the pandemic.  She recognizes the importance of losing this weight.    She has a history of prediabetes.  We will recheck an A1c today.  She has not been checking blood sugars at home.    Review of Systems  GEN: -fevers/-chills/-night sweats/+wt change increase 12 lbs in past year  NEURO: -headaches/+vision changes - with macular degeneration and cataract  EARS: -hearing changes/-tinnitus  NOSE: +drainage/-congestion  MOUTH/THROAT: - sore throat/-dysphagia/-sores  LUNGS: -sob/-cough  CARDIOVASCULAR: -cp/-palpitations  ABD: -pain/-change in bowels/-bloody stools  : -change in bladder/-sores/-vaginal discharge or bleeding  HEMATOLOGIC/LYMPHATIC: -swollen nodes  SKIN: -rashes/-lesions  MSK/RHEUM: +shoulder joint pain/-joint swelling  NEURO: -weakness/-parasthesias  PSYCH: -depression/-anxiety        Past Medical History:   Diagnosis Date     Hyperlipidemia 11/21/2011     Prediabetes 11/24/2015     Renal lithiasis 1/28/2019       Past Surgical History:   Procedure Laterality Date     COMBINED CYSTOSCOPY, URETEROSCOPY, LASER HOLMIUM LITHOTRIPSY URETER(S) Left 1/29/2019    Procedure: Left URETEROSCOPY, LASER HOLMIUM LITHOTRIPSY  and Stent Placement;  Surgeon: Jason Yepez MD;  Location: [facility] OR     LAPAROSCOPIC CHOLECYSTECTOMY       TONSILLECTOMY, ADENOIDECTOMY, COMBINED      as a child       Current Outpatient Medications   Medication Sig Dispense Refill     aspirin 81 MG tablet Take 81 mg by mouth daily with food       Calcium Carb-Cholecalciferol (CALCIUM 600 + D PO) Take 1 tablet by mouth 2 times daily        lovastatin (MEVACOR) 20 MG tablet Take 1 tablet (20 mg) by mouth At Bedtime 90 tablet 4     Multiple Vitamins-Minerals (ICAPS MV PO) Take 1 capsule by mouth 4 times daily        [START ON 2021] ketorolac (ACULAR) 0.5 % ophthalmic solution        [START ON 2021] moxifloxacin (VIGAMOX) 0.5 % ophthalmic solution        [START ON 2021] prednisoLONE acetate (PRED FORTE) 1 % ophthalmic suspension          No Known Allergies     Social History     Tobacco Use     Smoking status: Former Smoker     Packs/day: 0.40     Years: 22.00     Pack years: 8.80     Types: Cigarettes     Quit date: 1986     Years since quittin.0     Smokeless tobacco: Never Used   Substance Use Topics     Alcohol use: Yes     Alcohol/week: 0.8 standard drinks     Comment: Alcoholic Drinks/day: occasionally     Family History   Problem Relation Age of Onset     Cancer Mother         Cancer,Spinal Cancer     Prostate Cancer Other         Cancer-prostate     Cancer Other         Cancer,thyroid cancer     Unknown/Adopted Father         Unknown     Myocardial Infarction Maternal Grandmother 74        Heart attack     Asthma Maternal Grandfather         Asthma     No Known Problems Son      Cancer Maternal Aunt         Cancer,Hodgkin's lymphoma     Breast Cancer No family hx of         Cancer-breast     History   Drug Use No     Comment: Drug use: No         Objective     /84 (BP Location: Right arm, Patient Position: Sitting, Cuff Size: Adult Large)   Pulse 68   Temp 97.7  F (36.5  C) (Tympanic)   Resp 16   Ht 1.524 m (5')   Wt 105.2 kg  (232 lb)   SpO2 98%   BMI 45.31 kg/m      Physical Exam  GEN: Vitals reviewed. Healthy appearing. Patient is in no acute distress. Cooperative with exam.  HEENT: Normocephalic atraumatic.  Eyes grossly normal to inspection.  No discharge or erythema, or obvious scleral/conjunctival abnormalities.   NECK: Supple; no thyromegaly or masses noted.  No cervical or supraclavicular lymphadenopathy.  CV: Heart regular in rate and rhythm with no murmur.    LUNGS: No audible wheeze, cough, or visible cyanosis.  No visible retractions or increased work of breathing.  Lungs clear to auscultation bilaterally.    ABD:  Obese.  SKIN: Warm and dry to touch.  Visible skin clear. No significant rash, abnormal pigmentation or lesions.  EXT: No clubbing or cyanosis.  Trace lower ext peripheral edema.  NEURO: Alert and oriented to person, place, and time.  Cranial nerves II-XII grossly intact with no focal or lateralizing deficits.  Muscle tone normal.  Gait normal. No tremor.   MSK: ROM of upper and lower ext symmetric and full.  PSYCH: Mood is good.  Mentation appears normal, affect normal/bright, judgement and insight intact, normal speech and appearance well-groomed.      Diagnostics:  Recent Results (from the past 24 hour(s))   Hemoglobin A1c    Collection Time: 12/28/20 12:16 PM   Result Value Ref Range    Hemoglobin A1C 5.4 4.0 - 6.0 %   Lipid Profile    Collection Time: 12/28/20 12:16 PM   Result Value Ref Range    Cholesterol 187 <200 mg/dL    Triglycerides 184 (H) <150 mg/dL    HDL Cholesterol 46 23 - 92 mg/dL    LDL Cholesterol Calculated 104 (H) <100 mg/dL    Non HDL Cholesterol 141 (H) <130 mg/dL   CBC with platelets    Collection Time: 12/28/20 12:16 PM   Result Value Ref Range    WBC 6.3 4.0 - 11.0 10e9/L    RBC Count 4.72 3.8 - 5.2 10e12/L    Hemoglobin 13.8 11.7 - 15.7 g/dL    Hematocrit 42.2 35.0 - 47.0 %    MCV 89 78 - 100 fl    MCH 29.2 26.5 - 33.0 pg    MCHC 32.7 31.5 - 36.5 g/dL    RDW 13.4 10.0 - 15.0 %     Platelet Count 235 150 - 450 10e9/L   Comprehensive metabolic panel    Collection Time: 12/28/20 12:16 PM   Result Value Ref Range    Sodium 141 134 - 144 mmol/L    Potassium 4.2 3.5 - 5.1 mmol/L    Chloride 106 98 - 107 mmol/L    Carbon Dioxide 26 21 - 31 mmol/L    Anion Gap 9 3 - 14 mmol/L    Glucose 117 (H) 70 - 105 mg/dL    Urea Nitrogen 13 7 - 25 mg/dL    Creatinine 0.76 0.60 - 1.20 mg/dL    GFR Estimate 74 >60 mL/min/[1.73_m2]    GFR Estimate If Black 90 >60 mL/min/[1.73_m2]    Calcium 9.2 8.6 - 10.3 mg/dL    Bilirubin Total 0.5 0.3 - 1.0 mg/dL    Albumin 4.1 3.5 - 5.7 g/dL    Protein Total 6.7 6.4 - 8.9 g/dL    Alkaline Phosphatase 65 34 - 104 U/L    ALT 15 7 - 52 U/L    AST 20 13 - 39 U/L      No EKG required for low risk surgery (cataract, skin procedure, breast biopsy, etc).    Revised Cardiac Risk Index (RCRI):  The patient has the following serious cardiovascular risks for perioperative complications:   - No serious cardiac risks = 0 points     RCRI Interpretation: 0 points: Class I (very low risk - 0.4% complication rate)         Assessment & Plan   The proposed surgical procedure is considered LOW risk.      ICD-10-CM    1. Preoperative examination  Z01.818 CBC with platelets   2. Pure hypercholesterolemia  E78.00 lovastatin (MEVACOR) 20 MG tablet     Lipid Profile   3. Morbid obesity (H)  E66.01    4. Prediabetes  R73.03 Comprehensive metabolic panel     Hemoglobin A1c            Risks and Recommendations:  The patient has the following additional risks and recommendations for perioperative complications:   - No identified additional risk factors other than previously addressed    Medication Instructions:  Patient is to take all scheduled medications on the day of surgery    RECOMMENDATION:  APPROVAL GIVEN to proceed with proposed procedure, without further diagnostic evaluation.    Signed Electronically by: Savita Tariq DO    Copy of this evaluation report is provided to requesting  physician.    Preop [facility] Preop Guidelines    Revised Cardiac Risk Index

## 2021-03-25 ENCOUNTER — TELEPHONE (OUTPATIENT)
Dept: INTERNAL MEDICINE | Facility: OTHER | Age: 76
End: 2021-03-25

## 2021-03-25 ENCOUNTER — MEDICAL CORRESPONDENCE (OUTPATIENT)
Dept: HEALTH INFORMATION MANAGEMENT | Facility: OTHER | Age: 76
End: 2021-03-25

## 2021-03-25 NOTE — TELEPHONE ENCOUNTER
PT/OT/Home Care order ok relayed.  Norma Clements CMA([facility])..................3/25/2021   2:02 PM

## 2021-03-29 ENCOUNTER — TELEPHONE (OUTPATIENT)
Dept: INTERNAL MEDICINE | Facility: OTHER | Age: 76
End: 2021-03-29

## 2021-03-29 NOTE — TELEPHONE ENCOUNTER
Left message to call back.  Norma Clements CMA([facility]) ....................  3/29/2021   2:42 PM

## 2021-03-29 NOTE — TELEPHONE ENCOUNTER
Patient needs an order for a blood draw from her picc line today 3/29/21.Please call Lili at [facility] as soon as possible.    Betty Victoria on 3/29/2021 at 9:27 AM

## 2021-03-30 NOTE — TELEPHONE ENCOUNTER
Lili isn't sure what order is needed and will call the home care nurse to clarify. Not sure if we need to just give a verbal ok to draw from PICC line or if they actually need labs ordered and what they are.   She will call us back.  Norma Clements CMA([facility])..................3/30/2021   1:12 PM

## (undated) DEVICE — CATH URETERAL 5FRX70CM OPEN END FLEX TIP G14521

## (undated) DEVICE — TUOGHY BORST ADAPTER WITH SIDE ARM

## (undated) DEVICE — GUIDEWIRE SENSOR DUAL FLEX STR 0.035"X150CM M0066703080

## (undated) DEVICE — SLEEVE COMPRESSION SCD KNEE MED 74022

## (undated) DEVICE — PACK CYSTO SBA15CSFCA

## (undated) DEVICE — BASKET NITINOL TIPLESS HALO  1.5FRX120CM 554120

## (undated) DEVICE — LASER FIBER HOLMIUM FLEXIVA TRACTIP 200UM M0068403960

## (undated) DEVICE — GUIDEWIRE AMPLATZ SUPER STIFF .038"X145CM M0066401061

## (undated) DEVICE — GLOVE PROTEXIS POWDER FREE SMT 8.5 2D72PT85X

## (undated) DEVICE — CATH INTERMITTENT CLEAN-CATH 16FR 16" VINYL LF 421716

## (undated) DEVICE — PAD CHUX UNDERPAD 30X36" P3036C

## (undated) DEVICE — SOL WATER 1500ML

## (undated) DEVICE — Device

## (undated) RX ORDER — FENTANYL CITRATE 50 UG/ML
INJECTION, SOLUTION INTRAMUSCULAR; INTRAVENOUS
Status: DISPENSED
Start: 2019-01-28

## (undated) RX ORDER — LIDOCAINE HYDROCHLORIDE 20 MG/ML
INJECTION, SOLUTION EPIDURAL; INFILTRATION; INTRACAUDAL; PERINEURAL
Status: DISPENSED
Start: 2019-01-29

## (undated) RX ORDER — SODIUM CHLORIDE, SODIUM LACTATE, POTASSIUM CHLORIDE, CALCIUM CHLORIDE 600; 310; 30; 20 MG/100ML; MG/100ML; MG/100ML; MG/100ML
INJECTION, SOLUTION INTRAVENOUS
Status: DISPENSED
Start: 2019-01-28

## (undated) RX ORDER — ONDANSETRON 2 MG/ML
INJECTION INTRAMUSCULAR; INTRAVENOUS
Status: DISPENSED
Start: 2019-01-29

## (undated) RX ORDER — DEXAMETHASONE SODIUM PHOSPHATE 4 MG/ML
INJECTION, SOLUTION INTRA-ARTICULAR; INTRALESIONAL; INTRAMUSCULAR; INTRAVENOUS; SOFT TISSUE
Status: DISPENSED
Start: 2019-01-29

## (undated) RX ORDER — SODIUM CHLORIDE 9 MG/ML
INJECTION, SOLUTION INTRAVENOUS
Status: DISPENSED
Start: 2018-11-09

## (undated) RX ORDER — CEFUROXIME AXETIL 250 MG/1
TABLET ORAL
Status: DISPENSED
Start: 2019-02-07

## (undated) RX ORDER — GLYCOPYRROLATE 0.2 MG/ML
INJECTION, SOLUTION INTRAMUSCULAR; INTRAVENOUS
Status: DISPENSED
Start: 2019-01-29

## (undated) RX ORDER — FENTANYL CITRATE 50 UG/ML
INJECTION, SOLUTION INTRAMUSCULAR; INTRAVENOUS
Status: DISPENSED
Start: 2019-01-29

## (undated) RX ORDER — KETOROLAC TROMETHAMINE 30 MG/ML
INJECTION, SOLUTION INTRAMUSCULAR; INTRAVENOUS
Status: DISPENSED
Start: 2019-01-29

## (undated) RX ORDER — PHENYLEPHRINE HCL IN 0.9% NACL 1 MG/10 ML
SYRINGE (ML) INTRAVENOUS
Status: DISPENSED
Start: 2019-01-29

## (undated) RX ORDER — CEFTRIAXONE SODIUM 1 G/50ML
INJECTION, SOLUTION INTRAVENOUS
Status: DISPENSED
Start: 2019-01-29

## (undated) RX ORDER — IBUPROFEN 200 MG
TABLET ORAL
Status: DISPENSED
Start: 2019-01-28

## (undated) RX ORDER — ACETAMINOPHEN 325 MG/1
TABLET ORAL
Status: DISPENSED
Start: 2019-01-28

## (undated) RX ORDER — ONDANSETRON 2 MG/ML
INJECTION INTRAMUSCULAR; INTRAVENOUS
Status: DISPENSED
Start: 2018-11-09

## (undated) RX ORDER — PROPOFOL 10 MG/ML
INJECTION, EMULSION INTRAVENOUS
Status: DISPENSED
Start: 2019-01-29